# Patient Record
Sex: MALE | Race: WHITE | Employment: UNEMPLOYED | URBAN - METROPOLITAN AREA
[De-identification: names, ages, dates, MRNs, and addresses within clinical notes are randomized per-mention and may not be internally consistent; named-entity substitution may affect disease eponyms.]

---

## 2019-08-18 ENCOUNTER — HOSPITAL ENCOUNTER (INPATIENT)
Age: 69
LOS: 5 days | Discharge: HOME HEALTH | DRG: 699 | End: 2019-08-23
Attending: EMERGENCY MEDICINE | Admitting: INTERNAL MEDICINE
Payer: COMMERCIAL

## 2019-08-18 ENCOUNTER — APPOINTMENT (OUTPATIENT)
Dept: GENERAL RADIOLOGY | Age: 69
DRG: 699 | End: 2019-08-18
Payer: COMMERCIAL

## 2019-08-18 DIAGNOSIS — T83.511A URINARY TRACT INFECTION ASSOCIATED WITH INDWELLING URETHRAL CATHETER, INITIAL ENCOUNTER (HCC): ICD-10-CM

## 2019-08-18 DIAGNOSIS — N39.0 URINARY TRACT INFECTION ASSOCIATED WITH INDWELLING URETHRAL CATHETER, INITIAL ENCOUNTER (HCC): ICD-10-CM

## 2019-08-18 DIAGNOSIS — A41.9 SEPSIS, DUE TO UNSPECIFIED ORGANISM: Primary | ICD-10-CM

## 2019-08-18 LAB
ALBUMIN SERPL-MCNC: 3.4 G/DL (ref 3.5–5.2)
ALP BLD-CCNC: 81 U/L (ref 40–129)
ALT SERPL-CCNC: 16 U/L (ref 0–40)
AMORPHOUS: ABNORMAL
ANION GAP SERPL CALCULATED.3IONS-SCNC: 11 MMOL/L (ref 7–16)
AST SERPL-CCNC: 34 U/L (ref 0–39)
BACTERIA: ABNORMAL /HPF
BASOPHILS ABSOLUTE: 0.04 E9/L (ref 0–0.2)
BASOPHILS RELATIVE PERCENT: 0.2 % (ref 0–2)
BILIRUB SERPL-MCNC: 0.4 MG/DL (ref 0–1.2)
BILIRUBIN URINE: NEGATIVE
BLOOD, URINE: ABNORMAL
BUN BLDV-MCNC: 22 MG/DL (ref 8–23)
CALCIUM SERPL-MCNC: 9.2 MG/DL (ref 8.6–10.2)
CHLORIDE BLD-SCNC: 101 MMOL/L (ref 98–107)
CLARITY: ABNORMAL
CO2: 24 MMOL/L (ref 22–29)
COLOR: YELLOW
CREAT SERPL-MCNC: 1.2 MG/DL (ref 0.7–1.2)
EOSINOPHILS ABSOLUTE: 0.03 E9/L (ref 0.05–0.5)
EOSINOPHILS RELATIVE PERCENT: 0.2 % (ref 0–6)
GFR AFRICAN AMERICAN: >60
GFR NON-AFRICAN AMERICAN: 60 ML/MIN/1.73
GLUCOSE BLD-MCNC: 207 MG/DL (ref 74–99)
GLUCOSE URINE: NEGATIVE MG/DL
HCT VFR BLD CALC: 44.7 % (ref 37–54)
HEMOGLOBIN: 14.1 G/DL (ref 12.5–16.5)
IMMATURE GRANULOCYTES #: 0.1 E9/L
IMMATURE GRANULOCYTES %: 0.6 % (ref 0–5)
KETONES, URINE: NEGATIVE MG/DL
LACTIC ACID: 2.6 MMOL/L (ref 0.5–2.2)
LEUKOCYTE ESTERASE, URINE: ABNORMAL
LIPASE: 45 U/L (ref 13–60)
LYMPHOCYTES ABSOLUTE: 0.97 E9/L (ref 1.5–4)
LYMPHOCYTES RELATIVE PERCENT: 6 % (ref 20–42)
MCH RBC QN AUTO: 28.5 PG (ref 26–35)
MCHC RBC AUTO-ENTMCNC: 31.5 % (ref 32–34.5)
MCV RBC AUTO: 90.5 FL (ref 80–99.9)
MONOCYTES ABSOLUTE: 0.71 E9/L (ref 0.1–0.95)
MONOCYTES RELATIVE PERCENT: 4.4 % (ref 2–12)
NEUTROPHILS ABSOLUTE: 14.34 E9/L (ref 1.8–7.3)
NEUTROPHILS RELATIVE PERCENT: 88.6 % (ref 43–80)
NITRITE, URINE: POSITIVE
PDW BLD-RTO: 14.5 FL (ref 11.5–15)
PH UA: 7 (ref 5–9)
PLATELET # BLD: 260 E9/L (ref 130–450)
PMV BLD AUTO: 10 FL (ref 7–12)
POTASSIUM SERPL-SCNC: 5.1 MMOL/L (ref 3.5–5)
PROTEIN UA: 100 MG/DL
RBC # BLD: 4.94 E12/L (ref 3.8–5.8)
RBC UA: ABNORMAL /HPF (ref 0–2)
SODIUM BLD-SCNC: 136 MMOL/L (ref 132–146)
SPECIFIC GRAVITY UA: 1.01 (ref 1–1.03)
TOTAL PROTEIN: 7.6 G/DL (ref 6.4–8.3)
TROPONIN: <0.01 NG/ML (ref 0–0.03)
UROBILINOGEN, URINE: 0.2 E.U./DL
WBC # BLD: 16.2 E9/L (ref 4.5–11.5)
WBC UA: >20 /HPF (ref 0–5)

## 2019-08-18 PROCEDURE — 87077 CULTURE AEROBIC IDENTIFY: CPT

## 2019-08-18 PROCEDURE — 6360000002 HC RX W HCPCS: Performed by: EMERGENCY MEDICINE

## 2019-08-18 PROCEDURE — 6370000000 HC RX 637 (ALT 250 FOR IP): Performed by: EMERGENCY MEDICINE

## 2019-08-18 PROCEDURE — 99285 EMERGENCY DEPT VISIT HI MDM: CPT

## 2019-08-18 PROCEDURE — 85025 COMPLETE CBC W/AUTO DIFF WBC: CPT

## 2019-08-18 PROCEDURE — 2580000003 HC RX 258: Performed by: EMERGENCY MEDICINE

## 2019-08-18 PROCEDURE — 87088 URINE BACTERIA CULTURE: CPT

## 2019-08-18 PROCEDURE — 36415 COLL VENOUS BLD VENIPUNCTURE: CPT

## 2019-08-18 PROCEDURE — 84484 ASSAY OF TROPONIN QUANT: CPT

## 2019-08-18 PROCEDURE — 87186 SC STD MICRODIL/AGAR DIL: CPT

## 2019-08-18 PROCEDURE — 96374 THER/PROPH/DIAG INJ IV PUSH: CPT

## 2019-08-18 PROCEDURE — 80053 COMPREHEN METABOLIC PANEL: CPT

## 2019-08-18 PROCEDURE — 2060000000 HC ICU INTERMEDIATE R&B

## 2019-08-18 PROCEDURE — 81001 URINALYSIS AUTO W/SCOPE: CPT

## 2019-08-18 PROCEDURE — 71045 X-RAY EXAM CHEST 1 VIEW: CPT

## 2019-08-18 PROCEDURE — 87040 BLOOD CULTURE FOR BACTERIA: CPT

## 2019-08-18 PROCEDURE — 93005 ELECTROCARDIOGRAM TRACING: CPT | Performed by: EMERGENCY MEDICINE

## 2019-08-18 PROCEDURE — 83605 ASSAY OF LACTIC ACID: CPT

## 2019-08-18 PROCEDURE — 83690 ASSAY OF LIPASE: CPT

## 2019-08-18 RX ORDER — ACETAMINOPHEN 650 MG/1
650 SUPPOSITORY RECTAL ONCE
Status: COMPLETED | OUTPATIENT
Start: 2019-08-18 | End: 2019-08-18

## 2019-08-18 RX ORDER — 0.9 % SODIUM CHLORIDE 0.9 %
30 INTRAVENOUS SOLUTION INTRAVENOUS ONCE
Status: COMPLETED | OUTPATIENT
Start: 2019-08-18 | End: 2019-08-18

## 2019-08-18 RX ORDER — SODIUM CHLORIDE 0.9 % (FLUSH) 0.9 %
10 SYRINGE (ML) INJECTION EVERY 12 HOURS SCHEDULED
Status: DISCONTINUED | OUTPATIENT
Start: 2019-08-18 | End: 2019-08-23 | Stop reason: HOSPADM

## 2019-08-18 RX ORDER — 0.9 % SODIUM CHLORIDE 0.9 %
1000 INTRAVENOUS SOLUTION INTRAVENOUS ONCE
Status: DISCONTINUED | OUTPATIENT
Start: 2019-08-18 | End: 2019-08-18

## 2019-08-18 RX ORDER — SERTRALINE HYDROCHLORIDE 25 MG/1
75 TABLET, FILM COATED ORAL DAILY
COMMUNITY

## 2019-08-18 RX ORDER — HYDROXYZINE HYDROCHLORIDE 25 MG/1
25 TABLET, FILM COATED ORAL EVERY 6 HOURS PRN
COMMUNITY

## 2019-08-18 RX ORDER — SODIUM CHLORIDE 0.9 % (FLUSH) 0.9 %
10 SYRINGE (ML) INJECTION PRN
Status: DISCONTINUED | OUTPATIENT
Start: 2019-08-18 | End: 2019-08-23 | Stop reason: HOSPADM

## 2019-08-18 RX ORDER — ACETAMINOPHEN 325 MG/1
650 TABLET ORAL EVERY 4 HOURS PRN
Status: DISCONTINUED | OUTPATIENT
Start: 2019-08-18 | End: 2019-08-23 | Stop reason: HOSPADM

## 2019-08-18 RX ADMIN — MEROPENEM 1 G: 1 INJECTION INTRAVENOUS at 21:30

## 2019-08-18 RX ADMIN — SODIUM CHLORIDE 2586 ML: 900 INJECTION, SOLUTION INTRAVENOUS at 20:20

## 2019-08-18 RX ADMIN — ACETAMINOPHEN 650 MG: 650 SUPPOSITORY RECTAL at 20:20

## 2019-08-18 ASSESSMENT — PAIN SCALES - GENERAL
PAINLEVEL_OUTOF10: 0
PAINLEVEL_OUTOF10: 3

## 2019-08-19 ENCOUNTER — APPOINTMENT (OUTPATIENT)
Dept: CT IMAGING | Age: 69
DRG: 699 | End: 2019-08-19
Payer: COMMERCIAL

## 2019-08-19 LAB
ALBUMIN SERPL-MCNC: 2.6 G/DL (ref 3.5–5.2)
ALP BLD-CCNC: 64 U/L (ref 40–129)
ALT SERPL-CCNC: 11 U/L (ref 0–40)
ANION GAP SERPL CALCULATED.3IONS-SCNC: 9 MMOL/L (ref 7–16)
AST SERPL-CCNC: 16 U/L (ref 0–39)
BILIRUB SERPL-MCNC: 0.4 MG/DL (ref 0–1.2)
BUN BLDV-MCNC: 18 MG/DL (ref 8–23)
CALCIUM SERPL-MCNC: 8.7 MG/DL (ref 8.6–10.2)
CHLORIDE BLD-SCNC: 112 MMOL/L (ref 98–107)
CO2: 22 MMOL/L (ref 22–29)
CREAT SERPL-MCNC: 1.1 MG/DL (ref 0.7–1.2)
GFR AFRICAN AMERICAN: >60
GFR NON-AFRICAN AMERICAN: >60 ML/MIN/1.73
GLUCOSE BLD-MCNC: 113 MG/DL (ref 74–99)
HCT VFR BLD CALC: 41 % (ref 37–54)
HEMOGLOBIN: 12.4 G/DL (ref 12.5–16.5)
LACTIC ACID: 1.9 MMOL/L (ref 0.5–2.2)
LACTIC ACID: 2.7 MMOL/L (ref 0.5–2.2)
MCH RBC QN AUTO: 28.5 PG (ref 26–35)
MCHC RBC AUTO-ENTMCNC: 30.2 % (ref 32–34.5)
MCV RBC AUTO: 94.3 FL (ref 80–99.9)
PDW BLD-RTO: 14.6 FL (ref 11.5–15)
PLATELET # BLD: 199 E9/L (ref 130–450)
PMV BLD AUTO: 9.8 FL (ref 7–12)
POTASSIUM SERPL-SCNC: 4.3 MMOL/L (ref 3.5–5)
RBC # BLD: 4.35 E12/L (ref 3.8–5.8)
SODIUM BLD-SCNC: 143 MMOL/L (ref 132–146)
TOTAL PROTEIN: 6.3 G/DL (ref 6.4–8.3)
WBC # BLD: 11.6 E9/L (ref 4.5–11.5)

## 2019-08-19 PROCEDURE — 36415 COLL VENOUS BLD VENIPUNCTURE: CPT

## 2019-08-19 PROCEDURE — C9113 INJ PANTOPRAZOLE SODIUM, VIA: HCPCS | Performed by: INTERNAL MEDICINE

## 2019-08-19 PROCEDURE — 92610 EVALUATE SWALLOWING FUNCTION: CPT | Performed by: SPEECH-LANGUAGE PATHOLOGIST

## 2019-08-19 PROCEDURE — 83605 ASSAY OF LACTIC ACID: CPT

## 2019-08-19 PROCEDURE — 6360000002 HC RX W HCPCS: Performed by: INTERNAL MEDICINE

## 2019-08-19 PROCEDURE — 2580000003 HC RX 258: Performed by: INTERNAL MEDICINE

## 2019-08-19 PROCEDURE — 85027 COMPLETE CBC AUTOMATED: CPT

## 2019-08-19 PROCEDURE — 74176 CT ABD & PELVIS W/O CONTRAST: CPT

## 2019-08-19 PROCEDURE — 2060000000 HC ICU INTERMEDIATE R&B

## 2019-08-19 PROCEDURE — 80053 COMPREHEN METABOLIC PANEL: CPT

## 2019-08-19 RX ORDER — ACETAMINOPHEN 650 MG/1
650 SUPPOSITORY RECTAL EVERY 6 HOURS PRN
Status: DISCONTINUED | OUTPATIENT
Start: 2019-08-19 | End: 2019-08-23 | Stop reason: HOSPADM

## 2019-08-19 RX ORDER — DEXTROSE, SODIUM CHLORIDE, SODIUM LACTATE, POTASSIUM CHLORIDE, AND CALCIUM CHLORIDE 5; .6; .31; .03; .02 G/100ML; G/100ML; G/100ML; G/100ML; G/100ML
INJECTION, SOLUTION INTRAVENOUS CONTINUOUS
Status: DISCONTINUED | OUTPATIENT
Start: 2019-08-19 | End: 2019-08-22

## 2019-08-19 RX ORDER — PANTOPRAZOLE SODIUM 40 MG/10ML
40 INJECTION, POWDER, LYOPHILIZED, FOR SOLUTION INTRAVENOUS DAILY
Status: DISCONTINUED | OUTPATIENT
Start: 2019-08-19 | End: 2019-08-19 | Stop reason: SDUPTHER

## 2019-08-19 RX ORDER — 0.9 % SODIUM CHLORIDE 0.9 %
10 VIAL (ML) INJECTION DAILY
Status: DISCONTINUED | OUTPATIENT
Start: 2019-08-19 | End: 2019-08-23 | Stop reason: HOSPADM

## 2019-08-19 RX ORDER — PANTOPRAZOLE SODIUM 40 MG/10ML
40 INJECTION, POWDER, LYOPHILIZED, FOR SOLUTION INTRAVENOUS DAILY
Status: DISCONTINUED | OUTPATIENT
Start: 2019-08-19 | End: 2019-08-23 | Stop reason: HOSPADM

## 2019-08-19 RX ORDER — SODIUM CHLORIDE 9 MG/ML
INJECTION, SOLUTION INTRAVENOUS CONTINUOUS
Status: DISCONTINUED | OUTPATIENT
Start: 2019-08-19 | End: 2019-08-19

## 2019-08-19 RX ADMIN — Medication 10 ML: at 00:48

## 2019-08-19 RX ADMIN — SODIUM CHLORIDE: 9 INJECTION, SOLUTION INTRAVENOUS at 00:48

## 2019-08-19 RX ADMIN — MEROPENEM 1 G: 1 INJECTION, POWDER, FOR SOLUTION INTRAVENOUS at 13:16

## 2019-08-19 RX ADMIN — Medication 10 ML: at 09:02

## 2019-08-19 RX ADMIN — MEROPENEM 1 G: 1 INJECTION, POWDER, FOR SOLUTION INTRAVENOUS at 05:28

## 2019-08-19 RX ADMIN — SODIUM CHLORIDE, SODIUM LACTATE, POTASSIUM CHLORIDE, CALCIUM CHLORIDE AND DEXTROSE MONOHYDRATE: 5; 600; 310; 30; 20 INJECTION, SOLUTION INTRAVENOUS at 09:00

## 2019-08-19 RX ADMIN — ENOXAPARIN SODIUM 40 MG: 40 INJECTION SUBCUTANEOUS at 09:02

## 2019-08-19 RX ADMIN — PANTOPRAZOLE SODIUM 40 MG: 40 INJECTION, POWDER, FOR SOLUTION INTRAVENOUS at 09:02

## 2019-08-19 ASSESSMENT — PAIN SCALES - GENERAL
PAINLEVEL_OUTOF10: 2
PAINLEVEL_OUTOF10: 2
PAINLEVEL_OUTOF10: 3

## 2019-08-20 ENCOUNTER — APPOINTMENT (OUTPATIENT)
Dept: INTERVENTIONAL RADIOLOGY/VASCULAR | Age: 69
DRG: 699 | End: 2019-08-20
Payer: COMMERCIAL

## 2019-08-20 LAB
ANION GAP SERPL CALCULATED.3IONS-SCNC: 8 MMOL/L (ref 7–16)
BASOPHILS ABSOLUTE: 0.02 E9/L (ref 0–0.2)
BASOPHILS RELATIVE PERCENT: 0.2 % (ref 0–2)
BUN BLDV-MCNC: 9 MG/DL (ref 8–23)
CALCIUM SERPL-MCNC: 8.8 MG/DL (ref 8.6–10.2)
CHLORIDE BLD-SCNC: 106 MMOL/L (ref 98–107)
CO2: 27 MMOL/L (ref 22–29)
CREAT SERPL-MCNC: 0.9 MG/DL (ref 0.7–1.2)
EKG ATRIAL RATE: 112 BPM
EKG P AXIS: 47 DEGREES
EKG P-R INTERVAL: 128 MS
EKG Q-T INTERVAL: 342 MS
EKG QRS DURATION: 74 MS
EKG QTC CALCULATION (BAZETT): 466 MS
EKG R AXIS: 42 DEGREES
EKG T AXIS: 30 DEGREES
EKG VENTRICULAR RATE: 112 BPM
EOSINOPHILS ABSOLUTE: 0.2 E9/L (ref 0.05–0.5)
EOSINOPHILS RELATIVE PERCENT: 2.3 % (ref 0–6)
GFR AFRICAN AMERICAN: >60
GFR NON-AFRICAN AMERICAN: >60 ML/MIN/1.73
GLUCOSE BLD-MCNC: 140 MG/DL (ref 74–99)
HCT VFR BLD CALC: 41 % (ref 37–54)
HEMOGLOBIN: 12.6 G/DL (ref 12.5–16.5)
IMMATURE GRANULOCYTES #: 0.04 E9/L
IMMATURE GRANULOCYTES %: 0.5 % (ref 0–5)
LYMPHOCYTES ABSOLUTE: 1.61 E9/L (ref 1.5–4)
LYMPHOCYTES RELATIVE PERCENT: 18.8 % (ref 20–42)
MCH RBC QN AUTO: 28 PG (ref 26–35)
MCHC RBC AUTO-ENTMCNC: 30.7 % (ref 32–34.5)
MCV RBC AUTO: 91.1 FL (ref 80–99.9)
MONOCYTES ABSOLUTE: 0.82 E9/L (ref 0.1–0.95)
MONOCYTES RELATIVE PERCENT: 9.6 % (ref 2–12)
NEUTROPHILS ABSOLUTE: 5.88 E9/L (ref 1.8–7.3)
NEUTROPHILS RELATIVE PERCENT: 68.6 % (ref 43–80)
PDW BLD-RTO: 14.2 FL (ref 11.5–15)
PLATELET # BLD: 208 E9/L (ref 130–450)
PMV BLD AUTO: 10 FL (ref 7–12)
POTASSIUM SERPL-SCNC: 3.8 MMOL/L (ref 3.5–5)
RBC # BLD: 4.5 E12/L (ref 3.8–5.8)
SODIUM BLD-SCNC: 141 MMOL/L (ref 132–146)
WBC # BLD: 8.6 E9/L (ref 4.5–11.5)

## 2019-08-20 PROCEDURE — 6360000002 HC RX W HCPCS: Performed by: INTERNAL MEDICINE

## 2019-08-20 PROCEDURE — 2580000003 HC RX 258: Performed by: INTERNAL MEDICINE

## 2019-08-20 PROCEDURE — C1751 CATH, INF, PER/CENT/MIDLINE: HCPCS

## 2019-08-20 PROCEDURE — 6370000000 HC RX 637 (ALT 250 FOR IP): Performed by: INTERNAL MEDICINE

## 2019-08-20 PROCEDURE — 36415 COLL VENOUS BLD VENIPUNCTURE: CPT

## 2019-08-20 PROCEDURE — 2060000000 HC ICU INTERMEDIATE R&B

## 2019-08-20 PROCEDURE — 92526 ORAL FUNCTION THERAPY: CPT | Performed by: SPEECH-LANGUAGE PATHOLOGIST

## 2019-08-20 PROCEDURE — 85025 COMPLETE CBC W/AUTO DIFF WBC: CPT

## 2019-08-20 PROCEDURE — C9113 INJ PANTOPRAZOLE SODIUM, VIA: HCPCS | Performed by: INTERNAL MEDICINE

## 2019-08-20 PROCEDURE — 2500000003 HC RX 250 WO HCPCS: Performed by: RADIOLOGY

## 2019-08-20 PROCEDURE — 02HV33Z INSERTION OF INFUSION DEVICE INTO SUPERIOR VENA CAVA, PERCUTANEOUS APPROACH: ICD-10-PCS | Performed by: RADIOLOGY

## 2019-08-20 PROCEDURE — 80048 BASIC METABOLIC PNL TOTAL CA: CPT

## 2019-08-20 PROCEDURE — 93010 ELECTROCARDIOGRAM REPORT: CPT | Performed by: INTERNAL MEDICINE

## 2019-08-20 PROCEDURE — 36573 INSJ PICC RS&I 5 YR+: CPT

## 2019-08-20 RX ORDER — POLYETHYLENE GLYCOL 3350 17 G/17G
17 POWDER, FOR SOLUTION ORAL 2 TIMES DAILY
Status: DISCONTINUED | OUTPATIENT
Start: 2019-08-20 | End: 2019-08-23 | Stop reason: HOSPADM

## 2019-08-20 RX ORDER — SODIUM CHLORIDE 0.9 % (FLUSH) 0.9 %
10 SYRINGE (ML) INJECTION PRN
Status: DISCONTINUED | OUTPATIENT
Start: 2019-08-20 | End: 2019-08-20 | Stop reason: SDUPTHER

## 2019-08-20 RX ORDER — HEPARIN SODIUM (PORCINE) LOCK FLUSH IV SOLN 100 UNIT/ML 100 UNIT/ML
3 SOLUTION INTRAVENOUS PRN
Status: DISCONTINUED | OUTPATIENT
Start: 2019-08-20 | End: 2019-08-23 | Stop reason: HOSPADM

## 2019-08-20 RX ORDER — HEPARIN SODIUM (PORCINE) LOCK FLUSH IV SOLN 100 UNIT/ML 100 UNIT/ML
3 SOLUTION INTRAVENOUS EVERY 12 HOURS SCHEDULED
Status: DISCONTINUED | OUTPATIENT
Start: 2019-08-20 | End: 2019-08-23 | Stop reason: HOSPADM

## 2019-08-20 RX ORDER — LIDOCAINE HYDROCHLORIDE 10 MG/ML
10 INJECTION, SOLUTION INFILTRATION; PERINEURAL ONCE
Status: DISCONTINUED | OUTPATIENT
Start: 2019-08-20 | End: 2019-08-20 | Stop reason: ALTCHOICE

## 2019-08-20 RX ORDER — SODIUM CHLORIDE 0.9 % (FLUSH) 0.9 %
10 SYRINGE (ML) INJECTION EVERY 12 HOURS SCHEDULED
Status: DISCONTINUED | OUTPATIENT
Start: 2019-08-20 | End: 2019-08-20 | Stop reason: SDUPTHER

## 2019-08-20 RX ADMIN — MEROPENEM 1 G: 1 INJECTION, POWDER, FOR SOLUTION INTRAVENOUS at 06:36

## 2019-08-20 RX ADMIN — MEROPENEM 1 G: 1 INJECTION, POWDER, FOR SOLUTION INTRAVENOUS at 12:47

## 2019-08-20 RX ADMIN — SERTRALINE 75 MG: 50 TABLET, FILM COATED ORAL at 08:07

## 2019-08-20 RX ADMIN — Medication 10 ML: at 01:13

## 2019-08-20 RX ADMIN — MEROPENEM 1 G: 1 INJECTION, POWDER, FOR SOLUTION INTRAVENOUS at 01:13

## 2019-08-20 RX ADMIN — SODIUM CHLORIDE, SODIUM LACTATE, POTASSIUM CHLORIDE, CALCIUM CHLORIDE AND DEXTROSE MONOHYDRATE: 5; 600; 310; 30; 20 INJECTION, SOLUTION INTRAVENOUS at 01:13

## 2019-08-20 RX ADMIN — DOCUSATE SODIUM 100 MG: 50 LIQUID ORAL at 08:06

## 2019-08-20 RX ADMIN — LIDOCAINE HYDROCHLORIDE 10 ML: 10 INJECTION, SOLUTION INFILTRATION; PERINEURAL at 15:11

## 2019-08-20 RX ADMIN — PANTOPRAZOLE SODIUM 40 MG: 40 INJECTION, POWDER, FOR SOLUTION INTRAVENOUS at 08:06

## 2019-08-20 RX ADMIN — Medication 10 ML: at 08:11

## 2019-08-20 ASSESSMENT — PAIN SCALES - GENERAL
PAINLEVEL_OUTOF10: 5
PAINLEVEL_OUTOF10: 2
PAINLEVEL_OUTOF10: 2

## 2019-08-21 LAB
ALBUMIN SERPL-MCNC: 2.7 G/DL (ref 3.5–5.2)
ALP BLD-CCNC: 60 U/L (ref 40–129)
ALT SERPL-CCNC: 12 U/L (ref 0–40)
ANION GAP SERPL CALCULATED.3IONS-SCNC: 6 MMOL/L (ref 7–16)
AST SERPL-CCNC: 16 U/L (ref 0–39)
BASOPHILS ABSOLUTE: 0.02 E9/L (ref 0–0.2)
BASOPHILS RELATIVE PERCENT: 0.3 % (ref 0–2)
BILIRUB SERPL-MCNC: 0.3 MG/DL (ref 0–1.2)
BUN BLDV-MCNC: 10 MG/DL (ref 8–23)
CALCIUM SERPL-MCNC: 9 MG/DL (ref 8.6–10.2)
CEA: 1.4 NG/ML (ref 0–5.2)
CHLORIDE BLD-SCNC: 107 MMOL/L (ref 98–107)
CO2: 30 MMOL/L (ref 22–29)
CREAT SERPL-MCNC: 1 MG/DL (ref 0.7–1.2)
EOSINOPHILS ABSOLUTE: 0.27 E9/L (ref 0.05–0.5)
EOSINOPHILS RELATIVE PERCENT: 3.4 % (ref 0–6)
FERRITIN: 368 NG/ML
GFR AFRICAN AMERICAN: >60
GFR NON-AFRICAN AMERICAN: >60 ML/MIN/1.73
GLUCOSE BLD-MCNC: 122 MG/DL (ref 74–99)
HCT VFR BLD CALC: 38.1 % (ref 37–54)
HEMOGLOBIN: 12.1 G/DL (ref 12.5–16.5)
IMMATURE GRANULOCYTES #: 0.03 E9/L
IMMATURE GRANULOCYTES %: 0.4 % (ref 0–5)
IRON SATURATION: 31 % (ref 20–55)
IRON: 44 MCG/DL (ref 59–158)
LYMPHOCYTES ABSOLUTE: 1.76 E9/L (ref 1.5–4)
LYMPHOCYTES RELATIVE PERCENT: 22 % (ref 20–42)
MAGNESIUM: 1.7 MG/DL (ref 1.6–2.6)
MCH RBC QN AUTO: 28.9 PG (ref 26–35)
MCHC RBC AUTO-ENTMCNC: 31.8 % (ref 32–34.5)
MCV RBC AUTO: 90.9 FL (ref 80–99.9)
MONOCYTES ABSOLUTE: 0.58 E9/L (ref 0.1–0.95)
MONOCYTES RELATIVE PERCENT: 7.3 % (ref 2–12)
NEUTROPHILS ABSOLUTE: 5.34 E9/L (ref 1.8–7.3)
NEUTROPHILS RELATIVE PERCENT: 66.6 % (ref 43–80)
PDW BLD-RTO: 14.6 FL (ref 11.5–15)
PLATELET # BLD: 207 E9/L (ref 130–450)
PMV BLD AUTO: 9.7 FL (ref 7–12)
POTASSIUM SERPL-SCNC: 3.9 MMOL/L (ref 3.5–5)
RBC # BLD: 4.19 E12/L (ref 3.8–5.8)
SODIUM BLD-SCNC: 143 MMOL/L (ref 132–146)
TOTAL IRON BINDING CAPACITY: 144 MCG/DL (ref 250–450)
TOTAL PROTEIN: 6.7 G/DL (ref 6.4–8.3)
WBC # BLD: 8 E9/L (ref 4.5–11.5)

## 2019-08-21 PROCEDURE — C9113 INJ PANTOPRAZOLE SODIUM, VIA: HCPCS | Performed by: INTERNAL MEDICINE

## 2019-08-21 PROCEDURE — 87329 GIARDIA AG IA: CPT

## 2019-08-21 PROCEDURE — 2580000003 HC RX 258: Performed by: INTERNAL MEDICINE

## 2019-08-21 PROCEDURE — 85025 COMPLETE CBC W/AUTO DIFF WBC: CPT

## 2019-08-21 PROCEDURE — 6370000000 HC RX 637 (ALT 250 FOR IP): Performed by: HOSPITALIST

## 2019-08-21 PROCEDURE — 6360000002 HC RX W HCPCS: Performed by: INTERNAL MEDICINE

## 2019-08-21 PROCEDURE — 83540 ASSAY OF IRON: CPT

## 2019-08-21 PROCEDURE — 82705 FATS/LIPIDS FECES QUAL: CPT

## 2019-08-21 PROCEDURE — 87425 ROTAVIRUS AG IA: CPT

## 2019-08-21 PROCEDURE — 82378 CARCINOEMBRYONIC ANTIGEN: CPT

## 2019-08-21 PROCEDURE — 6360000002 HC RX W HCPCS: Performed by: RADIOLOGY

## 2019-08-21 PROCEDURE — 82272 OCCULT BLD FECES 1-3 TESTS: CPT

## 2019-08-21 PROCEDURE — 80053 COMPREHEN METABOLIC PANEL: CPT

## 2019-08-21 PROCEDURE — 83550 IRON BINDING TEST: CPT

## 2019-08-21 PROCEDURE — 83735 ASSAY OF MAGNESIUM: CPT

## 2019-08-21 PROCEDURE — 82728 ASSAY OF FERRITIN: CPT

## 2019-08-21 PROCEDURE — 87045 FECES CULTURE AEROBIC BACT: CPT

## 2019-08-21 PROCEDURE — 87328 CRYPTOSPORIDIUM AG IA: CPT

## 2019-08-21 PROCEDURE — 89055 LEUKOCYTE ASSESSMENT FECAL: CPT

## 2019-08-21 PROCEDURE — 2060000000 HC ICU INTERMEDIATE R&B

## 2019-08-21 PROCEDURE — 6370000000 HC RX 637 (ALT 250 FOR IP): Performed by: INTERNAL MEDICINE

## 2019-08-21 PROCEDURE — 36415 COLL VENOUS BLD VENIPUNCTURE: CPT

## 2019-08-21 RX ADMIN — MEROPENEM 1 G: 1 INJECTION, POWDER, FOR SOLUTION INTRAVENOUS at 05:35

## 2019-08-21 RX ADMIN — SODIUM CHLORIDE, SODIUM LACTATE, POTASSIUM CHLORIDE, CALCIUM CHLORIDE AND DEXTROSE MONOHYDRATE: 5; 600; 310; 30; 20 INJECTION, SOLUTION INTRAVENOUS at 20:41

## 2019-08-21 RX ADMIN — POLYETHYLENE GLYCOL 3350 17 G: 17 POWDER, FOR SOLUTION ORAL at 00:04

## 2019-08-21 RX ADMIN — HEPARIN 300 UNITS: 100 SYRINGE at 08:13

## 2019-08-21 RX ADMIN — Medication 10 ML: at 08:15

## 2019-08-21 RX ADMIN — SERTRALINE 75 MG: 50 TABLET, FILM COATED ORAL at 08:13

## 2019-08-21 RX ADMIN — MEROPENEM 1 G: 1 INJECTION, POWDER, FOR SOLUTION INTRAVENOUS at 11:33

## 2019-08-21 RX ADMIN — PANTOPRAZOLE SODIUM 40 MG: 40 INJECTION, POWDER, FOR SOLUTION INTRAVENOUS at 08:13

## 2019-08-21 RX ADMIN — MEROPENEM 1 G: 1 INJECTION, POWDER, FOR SOLUTION INTRAVENOUS at 00:11

## 2019-08-21 RX ADMIN — POLYETHYLENE GLYCOL 3350 17 G: 17 POWDER, FOR SOLUTION ORAL at 20:41

## 2019-08-21 RX ADMIN — POLYETHYLENE GLYCOL 3350 17 G: 17 POWDER, FOR SOLUTION ORAL at 08:13

## 2019-08-21 RX ADMIN — SODIUM CHLORIDE, SODIUM LACTATE, POTASSIUM CHLORIDE, CALCIUM CHLORIDE AND DEXTROSE MONOHYDRATE: 5; 600; 310; 30; 20 INJECTION, SOLUTION INTRAVENOUS at 06:34

## 2019-08-21 RX ADMIN — Medication 10 ML: at 00:11

## 2019-08-21 RX ADMIN — HEPARIN 300 UNITS: 100 SYRINGE at 00:10

## 2019-08-21 RX ADMIN — HEPARIN 300 UNITS: 100 SYRINGE at 20:41

## 2019-08-21 RX ADMIN — ENOXAPARIN SODIUM 40 MG: 40 INJECTION SUBCUTANEOUS at 08:14

## 2019-08-21 RX ADMIN — Medication 10 ML: at 08:13

## 2019-08-21 RX ADMIN — DOCUSATE SODIUM 100 MG: 50 LIQUID ORAL at 08:14

## 2019-08-21 RX ADMIN — MEROPENEM 1 G: 1 INJECTION, POWDER, FOR SOLUTION INTRAVENOUS at 20:40

## 2019-08-22 LAB
CRYPTOSPORIDIUM ANTIGEN STOOL: NORMAL
GIARDIA ANTIGEN STOOL: NORMAL
OCCULT BLOOD DIAGNOSTIC: NORMAL
ROTAVIRUS ANTIGEN: NORMAL
WHITE BLOOD CELLS (WBC), STOOL: NORMAL

## 2019-08-22 PROCEDURE — C9113 INJ PANTOPRAZOLE SODIUM, VIA: HCPCS | Performed by: INTERNAL MEDICINE

## 2019-08-22 PROCEDURE — 2580000003 HC RX 258: Performed by: INTERNAL MEDICINE

## 2019-08-22 PROCEDURE — 6370000000 HC RX 637 (ALT 250 FOR IP): Performed by: HOSPITALIST

## 2019-08-22 PROCEDURE — 6360000002 HC RX W HCPCS: Performed by: RADIOLOGY

## 2019-08-22 PROCEDURE — 6370000000 HC RX 637 (ALT 250 FOR IP): Performed by: INTERNAL MEDICINE

## 2019-08-22 PROCEDURE — 92526 ORAL FUNCTION THERAPY: CPT | Performed by: SPEECH-LANGUAGE PATHOLOGIST

## 2019-08-22 PROCEDURE — 6360000002 HC RX W HCPCS: Performed by: INTERNAL MEDICINE

## 2019-08-22 PROCEDURE — 1200000000 HC SEMI PRIVATE

## 2019-08-22 RX ADMIN — POLYETHYLENE GLYCOL 3350 17 G: 17 POWDER, FOR SOLUTION ORAL at 21:38

## 2019-08-22 RX ADMIN — MEROPENEM 1 G: 1 INJECTION, POWDER, FOR SOLUTION INTRAVENOUS at 05:43

## 2019-08-22 RX ADMIN — ENOXAPARIN SODIUM 40 MG: 40 INJECTION SUBCUTANEOUS at 09:28

## 2019-08-22 RX ADMIN — Medication 10 ML: at 21:39

## 2019-08-22 RX ADMIN — SERTRALINE 75 MG: 50 TABLET, FILM COATED ORAL at 09:28

## 2019-08-22 RX ADMIN — HEPARIN 300 UNITS: 100 SYRINGE at 09:29

## 2019-08-22 RX ADMIN — Medication 10 ML: at 09:29

## 2019-08-22 RX ADMIN — MEROPENEM 1 G: 1 INJECTION, POWDER, FOR SOLUTION INTRAVENOUS at 21:30

## 2019-08-22 RX ADMIN — DOCUSATE SODIUM 100 MG: 50 LIQUID ORAL at 09:28

## 2019-08-22 RX ADMIN — HEPARIN 300 UNITS: 100 SYRINGE at 21:38

## 2019-08-22 RX ADMIN — PANTOPRAZOLE SODIUM 40 MG: 40 INJECTION, POWDER, FOR SOLUTION INTRAVENOUS at 09:28

## 2019-08-22 RX ADMIN — Medication 10 ML: at 09:31

## 2019-08-22 RX ADMIN — POLYETHYLENE GLYCOL 3350 17 G: 17 POWDER, FOR SOLUTION ORAL at 09:20

## 2019-08-22 RX ADMIN — MEROPENEM 1 G: 1 INJECTION, POWDER, FOR SOLUTION INTRAVENOUS at 14:36

## 2019-08-22 ASSESSMENT — PAIN SCALES - PAIN ASSESSMENT IN ADVANCED DEMENTIA (PAINAD)
BREATHING: 0
TOTALSCORE: 0
NEGVOCALIZATION: 0
FACIALEXPRESSION: 0
BODYLANGUAGE: 0
CONSOLABILITY: 0
NEGVOCALIZATION: 0
BODYLANGUAGE: 0
BREATHING: 0
CONSOLABILITY: 0
FACIALEXPRESSION: 0
BREATHING: 0
NEGVOCALIZATION: 0
TOTALSCORE: 0
TOTALSCORE: 0
BODYLANGUAGE: 0
CONSOLABILITY: 0
FACIALEXPRESSION: 0

## 2019-08-22 ASSESSMENT — PAIN SCALES - GENERAL
PAINLEVEL_OUTOF10: 0
PAINLEVEL_OUTOF10: 0
PAINLEVEL_OUTOF10: 3

## 2019-08-23 VITALS
DIASTOLIC BLOOD PRESSURE: 77 MMHG | BODY MASS INDEX: 29.13 KG/M2 | WEIGHT: 196.7 LBS | SYSTOLIC BLOOD PRESSURE: 133 MMHG | TEMPERATURE: 98.8 F | RESPIRATION RATE: 16 BRPM | HEART RATE: 86 BPM | OXYGEN SATURATION: 95 % | HEIGHT: 69 IN

## 2019-08-23 LAB
ALBUMIN SERPL-MCNC: 3.1 G/DL (ref 3.5–5.2)
ALP BLD-CCNC: 69 U/L (ref 40–129)
ALT SERPL-CCNC: 19 U/L (ref 0–40)
ANION GAP SERPL CALCULATED.3IONS-SCNC: 7 MMOL/L (ref 7–16)
AST SERPL-CCNC: 22 U/L (ref 0–39)
BASOPHILS ABSOLUTE: 0.04 E9/L (ref 0–0.2)
BASOPHILS RELATIVE PERCENT: 0.5 % (ref 0–2)
BILIRUB SERPL-MCNC: 0.4 MG/DL (ref 0–1.2)
BUN BLDV-MCNC: 10 MG/DL (ref 8–23)
CALCIUM SERPL-MCNC: 9.5 MG/DL (ref 8.6–10.2)
CHLORIDE BLD-SCNC: 103 MMOL/L (ref 98–107)
CO2: 31 MMOL/L (ref 22–29)
CREAT SERPL-MCNC: 1 MG/DL (ref 0.7–1.2)
CULTURE, STOOL: NORMAL
EOSINOPHILS ABSOLUTE: 0.41 E9/L (ref 0.05–0.5)
EOSINOPHILS RELATIVE PERCENT: 5.1 % (ref 0–6)
GFR AFRICAN AMERICAN: >60
GFR NON-AFRICAN AMERICAN: >60 ML/MIN/1.73
GLUCOSE BLD-MCNC: 112 MG/DL (ref 74–99)
HCT VFR BLD CALC: 41.7 % (ref 37–54)
HEMOGLOBIN: 13.1 G/DL (ref 12.5–16.5)
IMMATURE GRANULOCYTES #: 0.04 E9/L
IMMATURE GRANULOCYTES %: 0.5 % (ref 0–5)
LYMPHOCYTES ABSOLUTE: 1.94 E9/L (ref 1.5–4)
LYMPHOCYTES RELATIVE PERCENT: 24.3 % (ref 20–42)
MCH RBC QN AUTO: 28.4 PG (ref 26–35)
MCHC RBC AUTO-ENTMCNC: 31.4 % (ref 32–34.5)
MCV RBC AUTO: 90.5 FL (ref 80–99.9)
MONOCYTES ABSOLUTE: 0.52 E9/L (ref 0.1–0.95)
MONOCYTES RELATIVE PERCENT: 6.5 % (ref 2–12)
NEUTROPHILS ABSOLUTE: 5.03 E9/L (ref 1.8–7.3)
NEUTROPHILS RELATIVE PERCENT: 63.1 % (ref 43–80)
PDW BLD-RTO: 14.3 FL (ref 11.5–15)
PLATELET # BLD: 203 E9/L (ref 130–450)
PMV BLD AUTO: 9.7 FL (ref 7–12)
POTASSIUM SERPL-SCNC: 3.9 MMOL/L (ref 3.5–5)
RBC # BLD: 4.61 E12/L (ref 3.8–5.8)
SODIUM BLD-SCNC: 141 MMOL/L (ref 132–146)
TOTAL PROTEIN: 7 G/DL (ref 6.4–8.3)
WBC # BLD: 8 E9/L (ref 4.5–11.5)

## 2019-08-23 PROCEDURE — 6360000002 HC RX W HCPCS: Performed by: INTERNAL MEDICINE

## 2019-08-23 PROCEDURE — 97530 THERAPEUTIC ACTIVITIES: CPT | Performed by: PHYSICAL THERAPIST

## 2019-08-23 PROCEDURE — C9113 INJ PANTOPRAZOLE SODIUM, VIA: HCPCS | Performed by: INTERNAL MEDICINE

## 2019-08-23 PROCEDURE — 85025 COMPLETE CBC W/AUTO DIFF WBC: CPT

## 2019-08-23 PROCEDURE — 2580000003 HC RX 258: Performed by: SPECIALIST

## 2019-08-23 PROCEDURE — 6370000000 HC RX 637 (ALT 250 FOR IP): Performed by: HOSPITALIST

## 2019-08-23 PROCEDURE — 2580000003 HC RX 258: Performed by: INTERNAL MEDICINE

## 2019-08-23 PROCEDURE — 97167 OT EVAL HIGH COMPLEX 60 MIN: CPT

## 2019-08-23 PROCEDURE — 97530 THERAPEUTIC ACTIVITIES: CPT

## 2019-08-23 PROCEDURE — 97161 PT EVAL LOW COMPLEX 20 MIN: CPT | Performed by: PHYSICAL THERAPIST

## 2019-08-23 PROCEDURE — 6360000002 HC RX W HCPCS: Performed by: SPECIALIST

## 2019-08-23 PROCEDURE — 6360000002 HC RX W HCPCS: Performed by: RADIOLOGY

## 2019-08-23 PROCEDURE — 36415 COLL VENOUS BLD VENIPUNCTURE: CPT

## 2019-08-23 PROCEDURE — 80053 COMPREHEN METABOLIC PANEL: CPT

## 2019-08-23 PROCEDURE — 6370000000 HC RX 637 (ALT 250 FOR IP): Performed by: INTERNAL MEDICINE

## 2019-08-23 RX ADMIN — POLYETHYLENE GLYCOL 3350 17 G: 17 POWDER, FOR SOLUTION ORAL at 08:46

## 2019-08-23 RX ADMIN — ENOXAPARIN SODIUM 40 MG: 40 INJECTION SUBCUTANEOUS at 08:46

## 2019-08-23 RX ADMIN — Medication 10 ML: at 08:47

## 2019-08-23 RX ADMIN — HEPARIN 300 UNITS: 100 SYRINGE at 08:47

## 2019-08-23 RX ADMIN — MEROPENEM 1 G: 1 INJECTION, POWDER, FOR SOLUTION INTRAVENOUS at 05:15

## 2019-08-23 RX ADMIN — PANTOPRAZOLE SODIUM 40 MG: 40 INJECTION, POWDER, FOR SOLUTION INTRAVENOUS at 08:47

## 2019-08-23 RX ADMIN — WATER 2 G: 1 INJECTION INTRAMUSCULAR; INTRAVENOUS; SUBCUTANEOUS at 10:23

## 2019-08-23 RX ADMIN — Medication 10 ML: at 08:48

## 2019-08-23 RX ADMIN — SERTRALINE 75 MG: 50 TABLET, FILM COATED ORAL at 08:46

## 2019-08-23 ASSESSMENT — PAIN SCALES - PAIN ASSESSMENT IN ADVANCED DEMENTIA (PAINAD)
TOTALSCORE: 0
FACIALEXPRESSION: 0
BODYLANGUAGE: 0
NEGVOCALIZATION: 0
CONSOLABILITY: 0
BREATHING: 0

## 2019-08-24 LAB
CULTURE, BLOOD 2: NORMAL
FECAL NEUTRAL FAT: NORMAL
FECAL SPLIT FATS: NORMAL

## 2019-08-25 LAB
ORGANISM: ABNORMAL
ORGANISM: ABNORMAL
URINE CULTURE, ROUTINE: ABNORMAL
URINE CULTURE, ROUTINE: ABNORMAL

## 2019-09-01 ENCOUNTER — HOSPITAL ENCOUNTER (EMERGENCY)
Age: 69
Discharge: HOME | End: 2019-09-01
Attending: EMERGENCY MEDICINE
Payer: COMMERCIAL

## 2019-09-01 VITALS
RESPIRATION RATE: 14 BRPM | DIASTOLIC BLOOD PRESSURE: 61 MMHG | HEART RATE: 71 BPM | WEIGHT: 203.1 LBS | BODY MASS INDEX: 29.99 KG/M2 | OXYGEN SATURATION: 98 % | TEMPERATURE: 98.5 F | SYSTOLIC BLOOD PRESSURE: 123 MMHG

## 2019-09-01 DIAGNOSIS — Z78.9 PROBLEM WITH VASCULAR ACCESS: Primary | ICD-10-CM

## 2019-09-01 PROCEDURE — 99283 EMERGENCY DEPT VISIT LOW MDM: CPT

## 2019-09-01 ASSESSMENT — ENCOUNTER SYMPTOMS
SINUS PAIN: 0
SHORTNESS OF BREATH: 0
COUGH: 0
BACK PAIN: 0
NAUSEA: 0
SORE THROAT: 0
VOMITING: 0
ABDOMINAL PAIN: 0
CHEST TIGHTNESS: 0
DIARRHEA: 0

## 2019-09-01 NOTE — ED PROVIDER NOTES
sensory deficit. He exhibits normal muscle tone. Answers basic questions like telling me his name and that he is not in any pain   Skin: Skin is warm and dry. Capillary refill takes less than 2 seconds. He is not diaphoretic. R PICC hanging out of patient's arm, no bleeding, no redness, no sign of infection   Psychiatric: He has a normal mood and affect. His behavior is normal. Judgment and thought content normal.   Nursing note and vitals reviewed. Procedures     MDM  Number of Diagnoses or Management Options  Problem with vascular access:   Diagnosis management comments: This is a 50-year-old male presents from nursing home for vascular access. His right PICC is essentially pulled out of his arm. He is getting IV Rocephin for UTI. As it is a Sunday holiday evening and we do not have a vascular access team here to place a PICC line, IV is placed and we will have the patient follow-up for additional access tomorrow or the following day. Patient discharged back to nursing home with stable vital signs. ED Course as of Sep 01 1626   Sun Sep 01, 2019   1606 PICC line out of place, will pull in place peripheral IV and send back to nursing home. He is receiving Rocephin and does not need a midline/picc at this time, he can have this placed whenever the team is available. [CW]   Chuckie:     I have personally performed and/or participated in the history, exam, medical decision making, and procedures and agree with all pertinent clinical information unless otherwise noted. I have also reviewed and agree with the past medical, family and social history unless otherwise noted. I have discussed this patient in detail with the resident, and provided the instruction and education regarding patient here for evaluation of a dislodged right arm PICC line that he has in for receiving Rocephin. No other complaints. No arm swelling or redness.   Patient has a history of

## 2019-09-01 NOTE — ED NOTES
PICC line removed and Peripheral IV inserted.  Pressure dressing applied to right basilic      Steve Bassett RN  09/01/19 1810

## 2019-09-02 NOTE — ED NOTES
Pt sent back to [facility] attempting to call report no answer     Zoey Bagley RN  09/01/19 2001

## 2020-07-02 ENCOUNTER — PREP FOR PROCEDURE (OUTPATIENT)
Dept: DENTISTRY | Age: 70
End: 2020-07-02

## 2020-07-02 RX ORDER — SODIUM CHLORIDE 0.9 % (FLUSH) 0.9 %
10 SYRINGE (ML) INJECTION PRN
Status: CANCELLED | OUTPATIENT
Start: 2020-07-02

## 2020-07-02 RX ORDER — SODIUM CHLORIDE 0.9 % (FLUSH) 0.9 %
10 SYRINGE (ML) INJECTION EVERY 12 HOURS SCHEDULED
Status: CANCELLED | OUTPATIENT
Start: 2020-07-02

## 2020-07-02 RX ORDER — SODIUM CHLORIDE, SODIUM LACTATE, POTASSIUM CHLORIDE, CALCIUM CHLORIDE 600; 310; 30; 20 MG/100ML; MG/100ML; MG/100ML; MG/100ML
INJECTION, SOLUTION INTRAVENOUS CONTINUOUS
Status: CANCELLED | OUTPATIENT
Start: 2020-07-02

## 2020-07-02 NOTE — PROGRESS NOTES
I CALLED DENTAL CLINIC AND SPOKE TO [phone]. PER MIKEY THEY HAVE MADE SEVERAL ATTEMPTS TO PMD IN ORDER TO OBTAIN H&P, THEY HAVE NOT RECEIVED H&P YET, CHRISTELLE TO BE NOTIFIED ON Monday MORNING 7/6/20.

## 2020-07-02 NOTE — PROGRESS NOTES
PER PATIENTS NURSE [place] AT [employer], PATIENT IS A FULL CODE. PATIENTS LEGAL GUARDIAN, COLTONROLO MARTINORISON WILL ACCOMPANY PATIENT TO OR THE MORNING OF SURGERY AND SIGN HIS PERMITS. PATIENT TO ARRIVE TO PRE OP AT 0745. INSTRUCTIONS CALLED AND FAXED TO FACILITY.

## 2020-07-02 NOTE — H&P (VIEW-ONLY)
08/23/2019     08/23/2019    MCV 90.5 08/23/2019    MCH 28.4 08/23/2019    MCHC 31.4 08/23/2019    RDW 14.3 08/23/2019    LYMPHOPCT 24.3 08/23/2019    MONOPCT 6.5 08/23/2019    BASOPCT 0.5 08/23/2019    MONOSABS 0.52 08/23/2019    LYMPHSABS 1.94 08/23/2019    EOSABS 0.41 08/23/2019    BASOSABS 0.04 08/23/2019       Imaging Studies:  Radiology:   Pt was uncooperative and unable to take radiographs    Oral Findings:    Hygiene: mucous membranes moist, pharynx normal without lesions and dental hygiene poor    Dentition: poor    Teeth: caries: unknown    Retained roots #7    Impactions tooth # TBD    Gingiva: inflamed    Mucous Membrane: mucous membranes moist, pharynx normal without lesions and dental hygiene poor    Tongue: fissured    Floor of mouth: normal    Alveolar Process: normal    Salivary Glands: normal    Tentative Diagnosis: dental caries, retained root tip #7 and #30 MBL fracture    Resident Assessment and Plan: Update radiographs and determine restorative needs.  Most likely extractions of #7 and #30      Clifford Sprague DDS  7/2/2020  5:39 PM    Attending physician: Dr. Jagjit Butler

## 2020-07-02 NOTE — PROGRESS NOTES
[facility] PRE-ADMISSION TESTING GENERAL INSTRUCTIONS- [facility]-phone number:[phone]    GENERAL INSTRUCTIONS  [x] Antibacterial Soap shower Night before and/or AM of Surgery. [x] Nothing by mouth after midnight, including gum, candy, mints, or water. [x] You may brush your teeth, gargle, but do NOT swallow water. [x] Jewelry, valuables or body piercing's should not be brought to the hospital. All body and/or tongue piercing's must be removed prior to arriving to hospital.  ALL hair pins must be removed. [x] Do not wear makeup, lotions, powders, deodorant. Nail polish as directed by the nurse. [x] Arrange transportation with a responsible adult  to and from the hospital. If you do not have a responsible adult  to transport you, you will need to make arrangements with a medical transportation company (i.e. [employer]. A Uber/taxi/bus is not appropriate unless you are accompanied by a responsible adult ). Arrange for someone to be with you for the remainder of the day and for 24 hours after your procedure due to having had anesthesia. Who will be your  for transportation?_______[facility]___________   Who will be staying with you for 24 hrs after your procedure?_______RETURN TO [facility]  ___________  [x] Bring insurance card and photo ID. PARKING INSTRUCTIONS:   [x] Arrival Time:____0745  _________  [x] PLEASE ARRIVE AT [facility] ENTRANCE AT [phone] ON 7/10/2020  PLEASE WEAR A MASK. YOU WILL BE DIRECTED TO PRE OP. [x] [facility] [address] [facility], upon entering the hospital, take elevator B to the 3rd floor. EDUCATION INSTRUCTIONS:        [x] Incentive Spirometry,coughing & deep breathing exercises reviewed. [x]Medication information sheet(s)   [x]Fluoroscopy-Xray used in surgery reviewed with patient. Educational pamphlet placed in chart.         MEDICATION INSTRUCTIONS:   [x]Bring a complete list of your medications, please write the last time you took the medicine, give this list to the nurse. [x] Take the following medications the morning of surgery with 1-2 ounces of water: SEE LIST  [x] Stop herbal supplements and vitamins 5 days before your surgery. [x] Follow physician instructions regarding any blood thinners you may be taking. WHAT TO EXPECT:  [x] The day of surgery you will be greeted and checked in by the [facility] [employer] [facility].  In addition, you will be registered in the [facility] by a Patient Access Representative. Please bring your photo ID and insurance card. A nurse will greet you in accordance to the time you are needed in the pre-op area to prepare you for surgery. Please do not be discouraged if you are not greeted in the order you arrive as there are many variables that are involved in patient preparation. Your patience is greatly appreciated as you wait for your nurse. Please bring in items such as: books, magazines, newspapers, electronics, or any other items  to occupy your time in the waiting area. [x]  Delays may occur with surgery and staff will make a sincere effort to keep you informed of delays. If any delays occur with your procedure, we apologize ahead of time for your inconvenience as we recognize the value of your time.

## 2020-07-09 ENCOUNTER — ANESTHESIA EVENT (OUTPATIENT)
Dept: OPERATING ROOM | Age: 70
End: 2020-07-09
Payer: COMMERCIAL

## 2020-07-09 NOTE — PROGRESS NOTES
Breanna Mar at [facility] aware of surgery time change to 0730 with arrival time 0530.  States pt will be picked up at facility at [phone]

## 2020-07-10 ENCOUNTER — HOSPITAL ENCOUNTER (OUTPATIENT)
Age: 70
Setting detail: OUTPATIENT SURGERY
Discharge: NURSING, REHAB OR LONG-TERM CARE | End: 2020-07-10
Attending: DENTIST | Admitting: DENTIST
Payer: COMMERCIAL

## 2020-07-10 ENCOUNTER — ANESTHESIA (OUTPATIENT)
Dept: OPERATING ROOM | Age: 70
End: 2020-07-10
Payer: COMMERCIAL

## 2020-07-10 VITALS
OXYGEN SATURATION: 100 % | RESPIRATION RATE: 10 BRPM | SYSTOLIC BLOOD PRESSURE: 178 MMHG | TEMPERATURE: 96.1 F | DIASTOLIC BLOOD PRESSURE: 101 MMHG

## 2020-07-10 VITALS
TEMPERATURE: 97.3 F | DIASTOLIC BLOOD PRESSURE: 74 MMHG | WEIGHT: 160 LBS | SYSTOLIC BLOOD PRESSURE: 146 MMHG | HEIGHT: 72 IN | RESPIRATION RATE: 19 BRPM | OXYGEN SATURATION: 92 % | BODY MASS INDEX: 21.67 KG/M2 | HEART RATE: 93 BPM

## 2020-07-10 PROBLEM — K05.6 PERIODONTAL DISEASE: Chronic | Status: ACTIVE | Noted: 2020-07-10

## 2020-07-10 PROBLEM — K04.7 DENTAL ABSCESS: Chronic | Status: ACTIVE | Noted: 2020-07-10

## 2020-07-10 PROBLEM — K02.9 DENTAL CARIES: Chronic | Status: ACTIVE | Noted: 2020-07-10

## 2020-07-10 LAB
ANION GAP SERPL CALCULATED.3IONS-SCNC: 7 MMOL/L (ref 7–16)
BUN BLDV-MCNC: 14 MG/DL (ref 8–23)
CALCIUM SERPL-MCNC: 9.2 MG/DL (ref 8.6–10.2)
CHLORIDE BLD-SCNC: 104 MMOL/L (ref 98–107)
CO2: 30 MMOL/L (ref 22–29)
CREAT SERPL-MCNC: 1 MG/DL (ref 0.7–1.2)
GFR AFRICAN AMERICAN: >60
GFR NON-AFRICAN AMERICAN: >60 ML/MIN/1.73
GLUCOSE BLD-MCNC: 112 MG/DL (ref 74–99)
HCT VFR BLD CALC: 48 % (ref 37–54)
HEMOGLOBIN: 15.2 G/DL (ref 12.5–16.5)
MCH RBC QN AUTO: 29.1 PG (ref 26–35)
MCHC RBC AUTO-ENTMCNC: 31.7 % (ref 32–34.5)
MCV RBC AUTO: 92 FL (ref 80–99.9)
METER GLUCOSE: 99 MG/DL (ref 74–99)
PDW BLD-RTO: 14.7 FL (ref 11.5–15)
PLATELET # BLD: 212 E9/L (ref 130–450)
PMV BLD AUTO: 10 FL (ref 7–12)
POTASSIUM SERPL-SCNC: 4.2 MMOL/L (ref 3.5–5)
RBC # BLD: 5.22 E12/L (ref 3.8–5.8)
SARS-COV-2, NAAT: NOT DETECTED
SODIUM BLD-SCNC: 141 MMOL/L (ref 132–146)
WBC # BLD: 10.2 E9/L (ref 4.5–11.5)

## 2020-07-10 PROCEDURE — 7100000011 HC PHASE II RECOVERY - ADDTL 15 MIN: Performed by: DENTIST

## 2020-07-10 PROCEDURE — 2580000003 HC RX 258: Performed by: DENTIST

## 2020-07-10 PROCEDURE — 3700000000 HC ANESTHESIA ATTENDED CARE: Performed by: DENTIST

## 2020-07-10 PROCEDURE — 6370000000 HC RX 637 (ALT 250 FOR IP): Performed by: DENTIST

## 2020-07-10 PROCEDURE — 7100000010 HC PHASE II RECOVERY - FIRST 15 MIN: Performed by: DENTIST

## 2020-07-10 PROCEDURE — 6360000002 HC RX W HCPCS: Performed by: NURSE ANESTHETIST, CERTIFIED REGISTERED

## 2020-07-10 PROCEDURE — 7100000000 HC PACU RECOVERY - FIRST 15 MIN: Performed by: DENTIST

## 2020-07-10 PROCEDURE — 36415 COLL VENOUS BLD VENIPUNCTURE: CPT

## 2020-07-10 PROCEDURE — 3600000002 HC SURGERY LEVEL 2 BASE: Performed by: DENTIST

## 2020-07-10 PROCEDURE — 3700000001 HC ADD 15 MINUTES (ANESTHESIA): Performed by: DENTIST

## 2020-07-10 PROCEDURE — 2500000003 HC RX 250 WO HCPCS: Performed by: DENTIST

## 2020-07-10 PROCEDURE — 82962 GLUCOSE BLOOD TEST: CPT

## 2020-07-10 PROCEDURE — U0002 COVID-19 LAB TEST NON-CDC: HCPCS

## 2020-07-10 PROCEDURE — 3600000012 HC SURGERY LEVEL 2 ADDTL 15MIN: Performed by: DENTIST

## 2020-07-10 PROCEDURE — 80048 BASIC METABOLIC PNL TOTAL CA: CPT

## 2020-07-10 PROCEDURE — 85027 COMPLETE CBC AUTOMATED: CPT

## 2020-07-10 PROCEDURE — 7100000001 HC PACU RECOVERY - ADDTL 15 MIN: Performed by: DENTIST

## 2020-07-10 PROCEDURE — 2500000003 HC RX 250 WO HCPCS: Performed by: NURSE ANESTHETIST, CERTIFIED REGISTERED

## 2020-07-10 RX ORDER — LIDOCAINE HYDROCHLORIDE 20 MG/ML
INJECTION, SOLUTION INTRAVENOUS PRN
Status: DISCONTINUED | OUTPATIENT
Start: 2020-07-10 | End: 2020-07-10 | Stop reason: SDUPTHER

## 2020-07-10 RX ORDER — NEOSTIGMINE METHYLSULFATE 1 MG/ML
INJECTION, SOLUTION INTRAVENOUS PRN
Status: DISCONTINUED | OUTPATIENT
Start: 2020-07-10 | End: 2020-07-10 | Stop reason: SDUPTHER

## 2020-07-10 RX ORDER — SODIUM CHLORIDE, SODIUM LACTATE, POTASSIUM CHLORIDE, CALCIUM CHLORIDE 600; 310; 30; 20 MG/100ML; MG/100ML; MG/100ML; MG/100ML
INJECTION, SOLUTION INTRAVENOUS CONTINUOUS
Status: DISCONTINUED | OUTPATIENT
Start: 2020-07-10 | End: 2020-07-10 | Stop reason: HOSPADM

## 2020-07-10 RX ORDER — PHENYLEPHRINE HYDROCHLORIDE 10 MG/ML
INJECTION INTRAVENOUS PRN
Status: DISCONTINUED | OUTPATIENT
Start: 2020-07-10 | End: 2020-07-10 | Stop reason: SDUPTHER

## 2020-07-10 RX ORDER — FENTANYL CITRATE 50 UG/ML
INJECTION, SOLUTION INTRAMUSCULAR; INTRAVENOUS PRN
Status: DISCONTINUED | OUTPATIENT
Start: 2020-07-10 | End: 2020-07-10 | Stop reason: SDUPTHER

## 2020-07-10 RX ORDER — LIDOCAINE HYDROCHLORIDE AND EPINEPHRINE BITARTRATE 20; .01 MG/ML; MG/ML
INJECTION, SOLUTION SUBCUTANEOUS PRN
Status: DISCONTINUED | OUTPATIENT
Start: 2020-07-10 | End: 2020-07-10 | Stop reason: ALTCHOICE

## 2020-07-10 RX ORDER — ONDANSETRON 2 MG/ML
4 INJECTION INTRAMUSCULAR; INTRAVENOUS
Status: DISCONTINUED | OUTPATIENT
Start: 2020-07-10 | End: 2020-07-10 | Stop reason: HOSPADM

## 2020-07-10 RX ORDER — SODIUM CHLORIDE 0.9 % (FLUSH) 0.9 %
10 SYRINGE (ML) INJECTION PRN
Status: DISCONTINUED | OUTPATIENT
Start: 2020-07-10 | End: 2020-07-10 | Stop reason: HOSPADM

## 2020-07-10 RX ORDER — ROCURONIUM BROMIDE 10 MG/ML
INJECTION, SOLUTION INTRAVENOUS PRN
Status: DISCONTINUED | OUTPATIENT
Start: 2020-07-10 | End: 2020-07-10 | Stop reason: SDUPTHER

## 2020-07-10 RX ORDER — SODIUM CHLORIDE 0.9 % (FLUSH) 0.9 %
10 SYRINGE (ML) INJECTION EVERY 12 HOURS SCHEDULED
Status: DISCONTINUED | OUTPATIENT
Start: 2020-07-10 | End: 2020-07-10 | Stop reason: HOSPADM

## 2020-07-10 RX ORDER — ACETAMINOPHEN 500 MG
500 TABLET ORAL EVERY 6 HOURS PRN
Qty: 20 TABLET | Refills: 0 | Status: SHIPPED | OUTPATIENT
Start: 2020-07-10 | End: 2020-07-15

## 2020-07-10 RX ORDER — MORPHINE SULFATE 2 MG/ML
2 INJECTION, SOLUTION INTRAMUSCULAR; INTRAVENOUS EVERY 5 MIN PRN
Status: DISCONTINUED | OUTPATIENT
Start: 2020-07-10 | End: 2020-07-10 | Stop reason: HOSPADM

## 2020-07-10 RX ORDER — ONDANSETRON 2 MG/ML
INJECTION INTRAMUSCULAR; INTRAVENOUS PRN
Status: DISCONTINUED | OUTPATIENT
Start: 2020-07-10 | End: 2020-07-10 | Stop reason: SDUPTHER

## 2020-07-10 RX ORDER — IBUPROFEN 600 MG/1
600 TABLET ORAL EVERY 6 HOURS PRN
Qty: 28 TABLET | Refills: 0 | Status: ON HOLD | OUTPATIENT
Start: 2020-07-10 | End: 2021-01-01

## 2020-07-10 RX ORDER — PROPOFOL 10 MG/ML
INJECTION, EMULSION INTRAVENOUS PRN
Status: DISCONTINUED | OUTPATIENT
Start: 2020-07-10 | End: 2020-07-10 | Stop reason: SDUPTHER

## 2020-07-10 RX ORDER — PROMETHAZINE HYDROCHLORIDE 25 MG/ML
6.25 INJECTION, SOLUTION INTRAMUSCULAR; INTRAVENOUS
Status: DISCONTINUED | OUTPATIENT
Start: 2020-07-10 | End: 2020-07-10 | Stop reason: HOSPADM

## 2020-07-10 RX ORDER — AMOXICILLIN 500 MG/1
500 CAPSULE ORAL 3 TIMES DAILY
Qty: 21 CAPSULE | Refills: 0 | Status: SHIPPED | OUTPATIENT
Start: 2020-07-10 | End: 2020-07-17

## 2020-07-10 RX ORDER — MORPHINE SULFATE 2 MG/ML
1 INJECTION, SOLUTION INTRAMUSCULAR; INTRAVENOUS EVERY 5 MIN PRN
Status: DISCONTINUED | OUTPATIENT
Start: 2020-07-10 | End: 2020-07-10 | Stop reason: HOSPADM

## 2020-07-10 RX ORDER — MEPERIDINE HYDROCHLORIDE 25 MG/ML
12.5 INJECTION INTRAMUSCULAR; INTRAVENOUS; SUBCUTANEOUS EVERY 5 MIN PRN
Status: DISCONTINUED | OUTPATIENT
Start: 2020-07-10 | End: 2020-07-10 | Stop reason: HOSPADM

## 2020-07-10 RX ORDER — GLYCOPYRROLATE 1 MG/5 ML
SYRINGE (ML) INTRAVENOUS PRN
Status: DISCONTINUED | OUTPATIENT
Start: 2020-07-10 | End: 2020-07-10 | Stop reason: SDUPTHER

## 2020-07-10 RX ORDER — DEXAMETHASONE SODIUM PHOSPHATE 10 MG/ML
INJECTION INTRAMUSCULAR; INTRAVENOUS PRN
Status: DISCONTINUED | OUTPATIENT
Start: 2020-07-10 | End: 2020-07-10 | Stop reason: SDUPTHER

## 2020-07-10 RX ADMIN — SODIUM CHLORIDE, POTASSIUM CHLORIDE, SODIUM LACTATE AND CALCIUM CHLORIDE: 600; 310; 30; 20 INJECTION, SOLUTION INTRAVENOUS at 06:35

## 2020-07-10 RX ADMIN — PHENYLEPHRINE HYDROCHLORIDE 100 MCG: 10 INJECTION INTRAVENOUS at 10:27

## 2020-07-10 RX ADMIN — PHENYLEPHRINE HYDROCHLORIDE 100 MCG: 10 INJECTION INTRAVENOUS at 09:16

## 2020-07-10 RX ADMIN — FENTANYL CITRATE 50 MCG: 50 INJECTION, SOLUTION INTRAMUSCULAR; INTRAVENOUS at 08:19

## 2020-07-10 RX ADMIN — ROCURONIUM BROMIDE 30 MG: 10 INJECTION, SOLUTION INTRAVENOUS at 08:10

## 2020-07-10 RX ADMIN — Medication 0.4 MG: at 11:00

## 2020-07-10 RX ADMIN — Medication 3 MG: at 11:00

## 2020-07-10 RX ADMIN — LIDOCAINE HYDROCHLORIDE 60 MG: 20 INJECTION, SOLUTION INTRAVENOUS at 08:10

## 2020-07-10 RX ADMIN — ONDANSETRON HYDROCHLORIDE 4 MG: 2 INJECTION, SOLUTION INTRAMUSCULAR; INTRAVENOUS at 10:49

## 2020-07-10 RX ADMIN — DEXAMETHASONE SODIUM PHOSPHATE 10 MG: 10 INJECTION INTRAMUSCULAR; INTRAVENOUS at 08:10

## 2020-07-10 RX ADMIN — SODIUM CHLORIDE, POTASSIUM CHLORIDE, SODIUM LACTATE AND CALCIUM CHLORIDE: 600; 310; 30; 20 INJECTION, SOLUTION INTRAVENOUS at 09:16

## 2020-07-10 RX ADMIN — PHENYLEPHRINE HYDROCHLORIDE 100 MCG: 10 INJECTION INTRAVENOUS at 09:54

## 2020-07-10 RX ADMIN — ROCURONIUM BROMIDE 10 MG: 10 INJECTION, SOLUTION INTRAVENOUS at 10:03

## 2020-07-10 RX ADMIN — PHENYLEPHRINE HYDROCHLORIDE 100 MCG: 10 INJECTION INTRAVENOUS at 10:35

## 2020-07-10 RX ADMIN — PHENYLEPHRINE HYDROCHLORIDE 100 MCG: 10 INJECTION INTRAVENOUS at 09:11

## 2020-07-10 RX ADMIN — FENTANYL CITRATE 50 MCG: 50 INJECTION, SOLUTION INTRAMUSCULAR; INTRAVENOUS at 08:55

## 2020-07-10 RX ADMIN — FENTANYL CITRATE 50 MCG: 50 INJECTION, SOLUTION INTRAMUSCULAR; INTRAVENOUS at 10:03

## 2020-07-10 RX ADMIN — PROPOFOL 100 MG: 10 INJECTION, EMULSION INTRAVENOUS at 08:10

## 2020-07-10 RX ADMIN — PHENYLEPHRINE HYDROCHLORIDE 100 MCG: 10 INJECTION INTRAVENOUS at 08:42

## 2020-07-10 RX ADMIN — FENTANYL CITRATE 50 MCG: 50 INJECTION, SOLUTION INTRAMUSCULAR; INTRAVENOUS at 08:10

## 2020-07-10 RX ADMIN — ROCURONIUM BROMIDE 10 MG: 10 INJECTION, SOLUTION INTRAVENOUS at 08:38

## 2020-07-10 RX ADMIN — PHENYLEPHRINE HYDROCHLORIDE 100 MCG: 10 INJECTION INTRAVENOUS at 10:18

## 2020-07-10 ASSESSMENT — PAIN SCALES - PAIN ASSESSMENT IN ADVANCED DEMENTIA (PAINAD)
TOTALSCORE: 1
TOTALSCORE: 0
CONSOLABILITY: 0
NEGVOCALIZATION: 1
NEGVOCALIZATION: 0
FACIALEXPRESSION: 0
TOTALSCORE: 0
BODYLANGUAGE: 0
CONSOLABILITY: 0
BODYLANGUAGE: 0
BODYLANGUAGE: 0
CONSOLABILITY: 0
FACIALEXPRESSION: 0
BREATHING: 0
NEGVOCALIZATION: 0
BREATHING: 0
NEGVOCALIZATION: 1
CONSOLABILITY: 0
FACIALEXPRESSION: 0
FACIALEXPRESSION: 0
BODYLANGUAGE: 0
BREATHING: 0
NEGVOCALIZATION: 0
BREATHING: 0
TOTALSCORE: 0
NEGVOCALIZATION: 0
BREATHING: 0
BODYLANGUAGE: 0
FACIALEXPRESSION: 0
CONSOLABILITY: 0
TOTALSCORE: 0
FACIALEXPRESSION: 0
BODYLANGUAGE: 0
FACIALEXPRESSION: 0
CONSOLABILITY: 0
BREATHING: 0
NEGVOCALIZATION: 0
BREATHING: 0
BODYLANGUAGE: 0
TOTALSCORE: 1
CONSOLABILITY: 0
TOTALSCORE: 0

## 2020-07-10 ASSESSMENT — PULMONARY FUNCTION TESTS
PIF_VALUE: 21
PIF_VALUE: 20
PIF_VALUE: 21
PIF_VALUE: 30
PIF_VALUE: 25
PIF_VALUE: 21
PIF_VALUE: 26
PIF_VALUE: 21
PIF_VALUE: 20
PIF_VALUE: 21
PIF_VALUE: 21
PIF_VALUE: 15
PIF_VALUE: 22
PIF_VALUE: 28
PIF_VALUE: 20
PIF_VALUE: 21
PIF_VALUE: 20
PIF_VALUE: 1
PIF_VALUE: 18
PIF_VALUE: 28
PIF_VALUE: 21
PIF_VALUE: 22
PIF_VALUE: 20
PIF_VALUE: 20
PIF_VALUE: 0
PIF_VALUE: 24
PIF_VALUE: 19
PIF_VALUE: 22
PIF_VALUE: 26
PIF_VALUE: 20
PIF_VALUE: 19
PIF_VALUE: 0
PIF_VALUE: 19
PIF_VALUE: 20
PIF_VALUE: 22
PIF_VALUE: 21
PIF_VALUE: 19
PIF_VALUE: 21
PIF_VALUE: 0
PIF_VALUE: 19
PIF_VALUE: 19
PIF_VALUE: 24
PIF_VALUE: 20
PIF_VALUE: 21
PIF_VALUE: 20
PIF_VALUE: 20
PIF_VALUE: 21
PIF_VALUE: 21
PIF_VALUE: 19
PIF_VALUE: 20
PIF_VALUE: 21
PIF_VALUE: 24
PIF_VALUE: 20
PIF_VALUE: 20
PIF_VALUE: 22
PIF_VALUE: 21
PIF_VALUE: 22
PIF_VALUE: 22
PIF_VALUE: 19
PIF_VALUE: 19
PIF_VALUE: 23
PIF_VALUE: 1
PIF_VALUE: 20
PIF_VALUE: 24
PIF_VALUE: 18
PIF_VALUE: 20
PIF_VALUE: 21
PIF_VALUE: 22
PIF_VALUE: 20
PIF_VALUE: 19
PIF_VALUE: 22
PIF_VALUE: 20
PIF_VALUE: 19
PIF_VALUE: 22
PIF_VALUE: 20
PIF_VALUE: 24
PIF_VALUE: 7
PIF_VALUE: 19
PIF_VALUE: 21
PIF_VALUE: 19
PIF_VALUE: 22
PIF_VALUE: 19
PIF_VALUE: 20
PIF_VALUE: 20
PIF_VALUE: 21
PIF_VALUE: 19
PIF_VALUE: 20
PIF_VALUE: 19
PIF_VALUE: 20
PIF_VALUE: 20
PIF_VALUE: 27
PIF_VALUE: 21
PIF_VALUE: 20
PIF_VALUE: 5
PIF_VALUE: 20
PIF_VALUE: 21
PIF_VALUE: 21
PIF_VALUE: 19
PIF_VALUE: 19
PIF_VALUE: 20
PIF_VALUE: 23
PIF_VALUE: 20
PIF_VALUE: 20
PIF_VALUE: 19
PIF_VALUE: 19
PIF_VALUE: 20
PIF_VALUE: 19
PIF_VALUE: 26
PIF_VALUE: 20
PIF_VALUE: 22
PIF_VALUE: 20
PIF_VALUE: 15
PIF_VALUE: 20
PIF_VALUE: 22
PIF_VALUE: 19
PIF_VALUE: 21
PIF_VALUE: 27
PIF_VALUE: 15
PIF_VALUE: 23
PIF_VALUE: 21
PIF_VALUE: 20
PIF_VALUE: 23
PIF_VALUE: 19
PIF_VALUE: 19
PIF_VALUE: 26
PIF_VALUE: 19
PIF_VALUE: 20
PIF_VALUE: 11
PIF_VALUE: 1
PIF_VALUE: 21
PIF_VALUE: 22
PIF_VALUE: 21
PIF_VALUE: 21
PIF_VALUE: 20
PIF_VALUE: 20
PIF_VALUE: 22
PIF_VALUE: 19
PIF_VALUE: 20
PIF_VALUE: 22
PIF_VALUE: 20
PIF_VALUE: 21
PIF_VALUE: 24
PIF_VALUE: 0
PIF_VALUE: 21
PIF_VALUE: 21
PIF_VALUE: 0
PIF_VALUE: 27
PIF_VALUE: 23
PIF_VALUE: 20
PIF_VALUE: 22
PIF_VALUE: 21
PIF_VALUE: 26
PIF_VALUE: 23
PIF_VALUE: 24
PIF_VALUE: 20
PIF_VALUE: 21
PIF_VALUE: 19
PIF_VALUE: 18
PIF_VALUE: 20
PIF_VALUE: 27
PIF_VALUE: 21
PIF_VALUE: 26
PIF_VALUE: 22
PIF_VALUE: 22
PIF_VALUE: 20
PIF_VALUE: 16

## 2020-07-10 ASSESSMENT — PAIN - FUNCTIONAL ASSESSMENT: PAIN_FUNCTIONAL_ASSESSMENT: ADVANCED DEMENTIA

## 2020-07-10 NOTE — PROGRESS NOTES
Covid test done via right nares and sent to lab @ 0607    Pt in isolation for MDRO / urine; has hickey. Urine with sediment. Consent obtained from legal guardian who did not stay. Pt with diminished capacity, but giving instructions to pt ans RN cares for him.           UGO Pitt RN

## 2020-07-10 NOTE — BRIEF OP NOTE
Brief Postoperative Note      Patient: Fauzia Olivo  YOB: 1950  MRN: [id number]    Date of Procedure: 7/10/2020    Pre-Op Diagnosis: Fractured teeth    Post-Op Diagnosis: Caries, abscess, periodontal disease       Procedure(s):  DENTAL RADIOGRAPHS, RESTORATIONS, EXTRACTIONS X 14    Surgeon(s):  CHRISSY Servin DDS Reno Munro, DDS    Assistant:  DAYO Dempsey    Anesthesia: General EOTT    Estimated Blood Loss (mL): less than 10 mL    Complications: None    Specimens:   * No specimens in log *    Implants:  * No implants in log *      Drains:   Urethral Catheter Straight-tip 16 fr (Active)       Findings: Multiple fractured teeth that were non-restorable, generalized periodontal disease    Electronically signed by Ignacio Nunez DDS on 7/10/2020 at 11:13 AM

## 2020-07-10 NOTE — ANESTHESIA PRE PROCEDURE
Past Surgical History:  History reviewed. No pertinent surgical history. Social History:    Social History     Tobacco Use    Smoking status: Never Smoker    Smokeless tobacco: Never Used   Substance Use Topics    Alcohol use: No                                Counseling given: Not Answered      Vital Signs (Current):   Vitals:    07/02/20 1030 07/10/20 0704   BP:  (!) 148/87   Pulse:  68   Resp:  18   Temp:  36.1 °C (97 °F)   TempSrc:  Temporal   SpO2:  100%   Weight: 160 lb (72.6 kg) 160 lb (72.6 kg)   Height: 6' (1.829 m) 6' (1.829 m)                                              BP Readings from Last 3 Encounters:   07/10/20 (!) 148/87   09/13/19 120/80   09/01/19 123/61       NPO Status: Time of last liquid consumption: 2359                        Time of last solid consumption: 1800                        Date of last liquid consumption: 07/09/20                        Date of last solid food consumption: 07/09/20    BMI:   Wt Readings from Last 3 Encounters:   07/10/20 160 lb (72.6 kg)   09/01/19 203 lb 1.6 oz (92.1 kg)   08/18/19 196 lb 11.2 oz (89.2 kg)     Body mass index is 21.7 kg/m². CBC:   Lab Results   Component Value Date    WBC 10.2 07/10/2020    RBC 5.22 07/10/2020    HGB 15.2 07/10/2020    HCT 48.0 07/10/2020    MCV 92.0 07/10/2020    RDW 14.7 07/10/2020     07/10/2020       CMP:   Lab Results   Component Value Date     07/10/2020    K 4.2 07/10/2020     07/10/2020    CO2 30 07/10/2020    BUN 14 07/10/2020    CREATININE 1.0 07/10/2020    GFRAA >60 07/10/2020    LABGLOM >60 07/10/2020    GLUCOSE 112 07/10/2020    PROT 7.0 08/23/2019    CALCIUM 9.2 07/10/2020    BILITOT 0.3 09/03/2019    ALKPHOS 79 09/03/2019    AST 23 09/03/2019    ALT 17 09/03/2019       POC Tests: No results for input(s): POCGLU, POCNA, POCK, POCCL, POCBUN, POCHEMO, POCHCT in the last 72 hours.     Coags: No results found for: PROTIME, INR, APTT    HCG (If Applicable): No results found for: PREGTESTUR, PREGSERUM, HCG, HCGQUANT     ABGs: No results found for: PHART, PO2ART, HHJ5AQZ, APW1ASP, BEART, I3GUFOEU     Type & Screen (If Applicable):  No results found for: LABABO, LABRH    Drug/Infectious Status (If Applicable):  No results found for: HIV, HEPCAB    COVID-19 Screening (If Applicable):   Lab Results   Component Value Date    COVID19 Not Detected 07/10/2020     EKG 8/18/2019  Narrative & Impression     Sinus tachycardia  Otherwise normal ECG  No previous ECGs available  Confirmed by Michelle Sequeira ([id number]) on 8/20/2019 1:53:43 PM         Anesthesia Evaluation  Patient summary reviewed and Nursing notes reviewed  Airway: Mallampati: Unable to assess / NA       Comment: Unable to assess, pt does not follow commands. Dental:      Comment: KRISTAN    Pulmonary: breath sounds clear to auscultation                             Cardiovascular:            Rhythm: regular  Rate: normal                 ROS comment: Endocarditis     Neuro/Psych:   (+) psychiatric history:             ROS comment: Alzheimer's dementia (HCC)  Hallucinations GI/Hepatic/Renal:             Endo/Other:    (+) DiabetesType II DM, , .                  ROS comment: Prostate enlargement Abdominal:           Vascular:                                      Anesthesia Plan      general     ASA 3       Induction: intravenous. MIPS: Prophylactic antiemetics administered. Plan/risks discussed with: Patient unable to participate due to Alzheimer's dementia       Plan discussed with CRNA and attending. Myra Call RN   7/10/2020    DOS STAFF ADDENDUM:    Pt seen and examined, physical exam updated, chart reviewed including anesthesia, drug and allergy history. H&P reviewed. No interval changes to history or physical examination (unless noted above). NPO status confirmed. Pt. Unable to provide any medical hx. No family present to speak with. Will proceed with GA.     Scooby Granda DO  Staff

## 2020-07-10 NOTE — INTERVAL H&P NOTE
Update History & Physical    The patient's History and Physical of June 22, treatment plan was reviewed with the patient and I examined the patient. There was no change. The surgical site was confirmed by the patient and me. Plan: The risks, benefits, expected outcome, and alternative to the recommended procedure have been discussed with the patient. Patient understands and wants to proceed with the procedure.      Electronically signed by Kathryn Glover DDS on 7/10/2020 at 7:30 AM

## 2020-07-10 NOTE — OP NOTE
Date of Procedure: 7/10/2020     Surgeon: Michael Maynard DDS      Surgical Wound Classification: Clean Contaminated II    Preoperative Diagnosis: Fractured teeth     Postoperative Diagnosis: abscess, dental caries, periodontal disease    Operation: Exam, Prophy, Fluoride Treatment, Restorative:none Extractions: x14    Anesthesia: General EOTT    Estimated Blood Loss: <49 mL    Complications:  none    Fluids: 1600 mL    Specimen: none    Conditions:  good    Disposition: To PACU, stable    Procedure: This patient was initially seen in the preoperative holding area, where the history, physical and consents were updated and verified. The patient was then transferred via the anesthesia team and [facility] to operating room # 10 at [facility] on 7/10/2020 , at which time the patient was transferred from the [facility] onto the operating room table and placed in the supine position. The patient's arms and legs were padded at the sides. The patient had the general anesthetic monitors applied. Please see anesthesia notes for complete details. Once the patient was placed into a general anesthetic state, the surgical area was prepped and draped in a standard oral and maxillofacial surgery fashion. A throat pack was placed. A comprehensive oral exam was performed. 18 radiographs were taken. A treatment plan was formulated based upon presenting clinical and radiographic findings. No caries identified. A prophylaxis with pumice and SRP of remaining teeth was performed. Full mouth perio charting was completed. Fluoride and peridex applied. Surgical procedure was initiated. Using 1.5 length 27-gauge needle, and 5.1 mL of 2% lidocaine with 1:100,000 epinephrine. Utilizing proper surgical instruments and techniques, the following teeth were removed:  3, 4, 5, 13, 14, 15, 18, 19, 23, 24, 28, 29, 30, and 31.   Teeth were removed due to caries or severe periodontal disease supported by clinical and

## 2020-07-10 NOTE — PROGRESS NOTES
Called [facility] and gave post-op instructions to nurse taking care of patient. All questions addressed.

## 2020-07-10 NOTE — PROGRESS NOTES
Report called to [facility] , all questions answered at this time .   Called for patiens ambulance

## 2021-01-01 ENCOUNTER — APPOINTMENT (OUTPATIENT)
Dept: CT IMAGING | Age: 71
DRG: 698 | End: 2021-01-01
Payer: COMMERCIAL

## 2021-01-01 ENCOUNTER — HOSPITAL ENCOUNTER (INPATIENT)
Age: 71
LOS: 7 days | Discharge: NURSING, REHAB OR LONG-TERM CARE | DRG: 698 | End: 2021-10-20
Attending: EMERGENCY MEDICINE | Admitting: INTERNAL MEDICINE
Payer: COMMERCIAL

## 2021-01-01 ENCOUNTER — APPOINTMENT (OUTPATIENT)
Dept: GENERAL RADIOLOGY | Age: 71
DRG: 698 | End: 2021-01-01
Payer: COMMERCIAL

## 2021-01-01 VITALS
WEIGHT: 160 LBS | HEART RATE: 110 BPM | TEMPERATURE: 98.1 F | HEIGHT: 72 IN | OXYGEN SATURATION: 97 % | DIASTOLIC BLOOD PRESSURE: 72 MMHG | SYSTOLIC BLOOD PRESSURE: 126 MMHG | RESPIRATION RATE: 20 BRPM | BODY MASS INDEX: 21.67 KG/M2

## 2021-01-01 DIAGNOSIS — A41.9 SEPSIS, DUE TO UNSPECIFIED ORGANISM, UNSPECIFIED WHETHER ACUTE ORGAN DYSFUNCTION PRESENT (HCC): Primary | ICD-10-CM

## 2021-01-01 DIAGNOSIS — L89.43 PRESSURE INJURY OF CONTIGUOUS REGION INVOLVING BUTTOCK AND HIP, STAGE 3, UNSPECIFIED LATERALITY (HCC): ICD-10-CM

## 2021-01-01 DIAGNOSIS — N39.0 URINARY TRACT INFECTION ASSOCIATED WITH INDWELLING URETHRAL CATHETER, INITIAL ENCOUNTER (HCC): ICD-10-CM

## 2021-01-01 DIAGNOSIS — T83.511A URINARY TRACT INFECTION ASSOCIATED WITH INDWELLING URETHRAL CATHETER, INITIAL ENCOUNTER (HCC): ICD-10-CM

## 2021-01-01 LAB
ALBUMIN SERPL-MCNC: 2.7 G/DL (ref 3.5–5.2)
ALBUMIN SERPL-MCNC: 2.8 G/DL (ref 3.5–5.2)
ALBUMIN SERPL-MCNC: 2.9 G/DL (ref 3.5–5.2)
ALP BLD-CCNC: 66 U/L (ref 40–129)
ALP BLD-CCNC: 67 U/L (ref 40–129)
ALP BLD-CCNC: 72 U/L (ref 40–129)
ALP BLD-CCNC: 72 U/L (ref 40–129)
ALP BLD-CCNC: 73 U/L (ref 40–129)
ALP BLD-CCNC: 75 U/L (ref 40–129)
ALP BLD-CCNC: 78 U/L (ref 40–129)
ALT SERPL-CCNC: 10 U/L (ref 0–40)
ALT SERPL-CCNC: 11 U/L (ref 0–40)
ALT SERPL-CCNC: 14 U/L (ref 0–40)
ANION GAP SERPL CALCULATED.3IONS-SCNC: 10 MMOL/L (ref 7–16)
ANION GAP SERPL CALCULATED.3IONS-SCNC: 10 MMOL/L (ref 7–16)
ANION GAP SERPL CALCULATED.3IONS-SCNC: 11 MMOL/L (ref 7–16)
ANION GAP SERPL CALCULATED.3IONS-SCNC: 12 MMOL/L (ref 7–16)
ANION GAP SERPL CALCULATED.3IONS-SCNC: 12 MMOL/L (ref 7–16)
ANION GAP SERPL CALCULATED.3IONS-SCNC: 8 MMOL/L (ref 7–16)
ANION GAP SERPL CALCULATED.3IONS-SCNC: 9 MMOL/L (ref 7–16)
AST SERPL-CCNC: 18 U/L (ref 0–39)
AST SERPL-CCNC: 19 U/L (ref 0–39)
AST SERPL-CCNC: 19 U/L (ref 0–39)
AST SERPL-CCNC: 20 U/L (ref 0–39)
AST SERPL-CCNC: 22 U/L (ref 0–39)
AST SERPL-CCNC: 29 U/L (ref 0–39)
AST SERPL-CCNC: 31 U/L (ref 0–39)
BACTERIA: ABNORMAL /HPF
BASOPHILS ABSOLUTE: 0 E9/L (ref 0–0.2)
BASOPHILS ABSOLUTE: 0.01 E9/L (ref 0–0.2)
BASOPHILS ABSOLUTE: 0.02 E9/L (ref 0–0.2)
BASOPHILS ABSOLUTE: 0.04 E9/L (ref 0–0.2)
BASOPHILS ABSOLUTE: 0.05 E9/L (ref 0–0.2)
BASOPHILS RELATIVE PERCENT: 0 % (ref 0–2)
BASOPHILS RELATIVE PERCENT: 0.1 % (ref 0–2)
BASOPHILS RELATIVE PERCENT: 0.2 % (ref 0–2)
BASOPHILS RELATIVE PERCENT: 0.3 % (ref 0–2)
BASOPHILS RELATIVE PERCENT: 0.3 % (ref 0–2)
BASOPHILS RELATIVE PERCENT: 0.4 % (ref 0–2)
BASOPHILS RELATIVE PERCENT: 0.4 % (ref 0–2)
BILIRUB SERPL-MCNC: 0.3 MG/DL (ref 0–1.2)
BILIRUB SERPL-MCNC: 0.4 MG/DL (ref 0–1.2)
BILIRUB SERPL-MCNC: 0.4 MG/DL (ref 0–1.2)
BILIRUB SERPL-MCNC: 0.5 MG/DL (ref 0–1.2)
BILIRUB SERPL-MCNC: 0.6 MG/DL (ref 0–1.2)
BILIRUBIN URINE: NEGATIVE
BLOOD CULTURE, ROUTINE: NORMAL
BLOOD, URINE: ABNORMAL
BUN BLDV-MCNC: 11 MG/DL (ref 6–23)
BUN BLDV-MCNC: 12 MG/DL (ref 6–23)
BUN BLDV-MCNC: 15 MG/DL (ref 6–23)
BUN BLDV-MCNC: 16 MG/DL (ref 6–23)
BUN BLDV-MCNC: 19 MG/DL (ref 6–23)
BUN BLDV-MCNC: 25 MG/DL (ref 6–23)
BUN BLDV-MCNC: 9 MG/DL (ref 6–23)
CALCIUM SERPL-MCNC: 8.6 MG/DL (ref 8.6–10.2)
CALCIUM SERPL-MCNC: 9 MG/DL (ref 8.6–10.2)
CALCIUM SERPL-MCNC: 9.1 MG/DL (ref 8.6–10.2)
CALCIUM SERPL-MCNC: 9.1 MG/DL (ref 8.6–10.2)
CALCIUM SERPL-MCNC: 9.4 MG/DL (ref 8.6–10.2)
CALCIUM SERPL-MCNC: 9.4 MG/DL (ref 8.6–10.2)
CALCIUM SERPL-MCNC: 9.9 MG/DL (ref 8.6–10.2)
CHLORIDE BLD-SCNC: 102 MMOL/L (ref 98–107)
CHLORIDE BLD-SCNC: 107 MMOL/L (ref 98–107)
CHLORIDE BLD-SCNC: 107 MMOL/L (ref 98–107)
CHLORIDE BLD-SCNC: 109 MMOL/L (ref 98–107)
CHLORIDE BLD-SCNC: 111 MMOL/L (ref 98–107)
CHLORIDE BLD-SCNC: 112 MMOL/L (ref 98–107)
CHLORIDE BLD-SCNC: 112 MMOL/L (ref 98–107)
CLARITY: ABNORMAL
CO2: 23 MMOL/L (ref 22–29)
CO2: 24 MMOL/L (ref 22–29)
CO2: 24 MMOL/L (ref 22–29)
CO2: 25 MMOL/L (ref 22–29)
CO2: 25 MMOL/L (ref 22–29)
CO2: 26 MMOL/L (ref 22–29)
CO2: 26 MMOL/L (ref 22–29)
COLOR: YELLOW
CREAT SERPL-MCNC: 0.7 MG/DL (ref 0.7–1.2)
CREAT SERPL-MCNC: 0.8 MG/DL (ref 0.7–1.2)
CREAT SERPL-MCNC: 1 MG/DL (ref 0.7–1.2)
CREAT SERPL-MCNC: 1 MG/DL (ref 0.7–1.2)
CRYSTALS, UA: ABNORMAL /HPF
CRYSTALS, UA: ABNORMAL /HPF
CULTURE, BLOOD 2: NORMAL
EKG ATRIAL RATE: 114 BPM
EKG P AXIS: 40 DEGREES
EKG P-R INTERVAL: 128 MS
EKG Q-T INTERVAL: 336 MS
EKG QRS DURATION: 70 MS
EKG QTC CALCULATION (BAZETT): 463 MS
EKG R AXIS: 38 DEGREES
EKG T AXIS: 24 DEGREES
EKG VENTRICULAR RATE: 114 BPM
EOSINOPHILS ABSOLUTE: 0.04 E9/L (ref 0.05–0.5)
EOSINOPHILS ABSOLUTE: 0.04 E9/L (ref 0.05–0.5)
EOSINOPHILS ABSOLUTE: 0.11 E9/L (ref 0.05–0.5)
EOSINOPHILS ABSOLUTE: 0.12 E9/L (ref 0.05–0.5)
EOSINOPHILS ABSOLUTE: 0.16 E9/L (ref 0.05–0.5)
EOSINOPHILS ABSOLUTE: 0.17 E9/L (ref 0.05–0.5)
EOSINOPHILS ABSOLUTE: 0.22 E9/L (ref 0.05–0.5)
EOSINOPHILS RELATIVE PERCENT: 0.3 % (ref 0–6)
EOSINOPHILS RELATIVE PERCENT: 0.4 % (ref 0–6)
EOSINOPHILS RELATIVE PERCENT: 1 % (ref 0–6)
EOSINOPHILS RELATIVE PERCENT: 1.1 % (ref 0–6)
EOSINOPHILS RELATIVE PERCENT: 1.2 % (ref 0–6)
EOSINOPHILS RELATIVE PERCENT: 2 % (ref 0–6)
EOSINOPHILS RELATIVE PERCENT: 2.8 % (ref 0–6)
GFR AFRICAN AMERICAN: >60
GFR NON-AFRICAN AMERICAN: >60 ML/MIN/1.73
GLUCOSE BLD-MCNC: 105 MG/DL (ref 74–99)
GLUCOSE BLD-MCNC: 121 MG/DL (ref 74–99)
GLUCOSE BLD-MCNC: 128 MG/DL (ref 74–99)
GLUCOSE BLD-MCNC: 137 MG/DL (ref 74–99)
GLUCOSE BLD-MCNC: 138 MG/DL (ref 74–99)
GLUCOSE BLD-MCNC: 142 MG/DL (ref 74–99)
GLUCOSE BLD-MCNC: 90 MG/DL (ref 74–99)
GLUCOSE URINE: NEGATIVE MG/DL
HCT VFR BLD CALC: 37.1 % (ref 37–54)
HCT VFR BLD CALC: 39.3 % (ref 37–54)
HCT VFR BLD CALC: 44.7 % (ref 37–54)
HCT VFR BLD CALC: 46.2 % (ref 37–54)
HCT VFR BLD CALC: 48.2 % (ref 37–54)
HCT VFR BLD CALC: 48.4 % (ref 37–54)
HCT VFR BLD CALC: 49.6 % (ref 37–54)
HEMOGLOBIN: 11.7 G/DL (ref 12.5–16.5)
HEMOGLOBIN: 12 G/DL (ref 12.5–16.5)
HEMOGLOBIN: 13.6 G/DL (ref 12.5–16.5)
HEMOGLOBIN: 13.7 G/DL (ref 12.5–16.5)
HEMOGLOBIN: 13.8 G/DL (ref 12.5–16.5)
HEMOGLOBIN: 14 G/DL (ref 12.5–16.5)
HEMOGLOBIN: 14.8 G/DL (ref 12.5–16.5)
IMMATURE GRANULOCYTES #: 0.03 E9/L
IMMATURE GRANULOCYTES #: 0.06 E9/L
IMMATURE GRANULOCYTES #: 0.07 E9/L
IMMATURE GRANULOCYTES #: 0.07 E9/L
IMMATURE GRANULOCYTES #: 0.1 E9/L
IMMATURE GRANULOCYTES #: 0.1 E9/L
IMMATURE GRANULOCYTES %: 0.3 % (ref 0–5)
IMMATURE GRANULOCYTES %: 0.6 % (ref 0–5)
IMMATURE GRANULOCYTES %: 0.7 % (ref 0–5)
IMMATURE GRANULOCYTES %: 0.8 % (ref 0–5)
KETONES, URINE: NEGATIVE MG/DL
LACTIC ACID, SEPSIS: 1.2 MMOL/L (ref 0.5–1.9)
LACTIC ACID, SEPSIS: 2.2 MMOL/L (ref 0.5–1.9)
LACTIC ACID: 0.7 MMOL/L (ref 0.5–2.2)
LEUKOCYTE ESTERASE, URINE: ABNORMAL
LIPASE: 45 U/L (ref 13–60)
LYMPHOCYTES ABSOLUTE: 1.09 E9/L (ref 1.5–4)
LYMPHOCYTES ABSOLUTE: 1.17 E9/L (ref 1.5–4)
LYMPHOCYTES ABSOLUTE: 1.27 E9/L (ref 1.5–4)
LYMPHOCYTES ABSOLUTE: 1.58 E9/L (ref 1.5–4)
LYMPHOCYTES ABSOLUTE: 1.62 E9/L (ref 1.5–4)
LYMPHOCYTES ABSOLUTE: 1.64 E9/L (ref 1.5–4)
LYMPHOCYTES ABSOLUTE: 2.6 E9/L (ref 1.5–4)
LYMPHOCYTES RELATIVE PERCENT: 11.4 % (ref 20–42)
LYMPHOCYTES RELATIVE PERCENT: 11.9 % (ref 20–42)
LYMPHOCYTES RELATIVE PERCENT: 12.5 % (ref 20–42)
LYMPHOCYTES RELATIVE PERCENT: 13 % (ref 20–42)
LYMPHOCYTES RELATIVE PERCENT: 13.7 % (ref 20–42)
LYMPHOCYTES RELATIVE PERCENT: 19.5 % (ref 20–42)
LYMPHOCYTES RELATIVE PERCENT: 20.6 % (ref 20–42)
MCH RBC QN AUTO: 27.9 PG (ref 26–35)
MCH RBC QN AUTO: 28.1 PG (ref 26–35)
MCH RBC QN AUTO: 28.3 PG (ref 26–35)
MCH RBC QN AUTO: 28.3 PG (ref 26–35)
MCH RBC QN AUTO: 28.4 PG (ref 26–35)
MCH RBC QN AUTO: 28.6 PG (ref 26–35)
MCH RBC QN AUTO: 28.6 PG (ref 26–35)
MCHC RBC AUTO-ENTMCNC: 27.8 % (ref 32–34.5)
MCHC RBC AUTO-ENTMCNC: 29 % (ref 32–34.5)
MCHC RBC AUTO-ENTMCNC: 29.4 % (ref 32–34.5)
MCHC RBC AUTO-ENTMCNC: 30.5 % (ref 32–34.5)
MCHC RBC AUTO-ENTMCNC: 30.6 % (ref 32–34.5)
MCHC RBC AUTO-ENTMCNC: 30.6 % (ref 32–34.5)
MCHC RBC AUTO-ENTMCNC: 31.5 % (ref 32–34.5)
MCV RBC AUTO: 102.7 FL (ref 80–99.9)
MCV RBC AUTO: 90.7 FL (ref 80–99.9)
MCV RBC AUTO: 91 FL (ref 80–99.9)
MCV RBC AUTO: 92 FL (ref 80–99.9)
MCV RBC AUTO: 92.5 FL (ref 80–99.9)
MCV RBC AUTO: 96.5 FL (ref 80–99.9)
MCV RBC AUTO: 97.6 FL (ref 80–99.9)
METAMYELOCYTES RELATIVE PERCENT: 6 % (ref 0–1)
MONOCYTES ABSOLUTE: 0.25 E9/L (ref 0.1–0.95)
MONOCYTES ABSOLUTE: 0.46 E9/L (ref 0.1–0.95)
MONOCYTES ABSOLUTE: 0.51 E9/L (ref 0.1–0.95)
MONOCYTES ABSOLUTE: 0.53 E9/L (ref 0.1–0.95)
MONOCYTES ABSOLUTE: 0.66 E9/L (ref 0.1–0.95)
MONOCYTES ABSOLUTE: 0.95 E9/L (ref 0.1–0.95)
MONOCYTES ABSOLUTE: 1.1 E9/L (ref 0.1–0.95)
MONOCYTES RELATIVE PERCENT: 3 % (ref 2–12)
MONOCYTES RELATIVE PERCENT: 5 % (ref 2–12)
MONOCYTES RELATIVE PERCENT: 5.2 % (ref 2–12)
MONOCYTES RELATIVE PERCENT: 5.6 % (ref 2–12)
MONOCYTES RELATIVE PERCENT: 5.8 % (ref 2–12)
MONOCYTES RELATIVE PERCENT: 7.2 % (ref 2–12)
MONOCYTES RELATIVE PERCENT: 8.3 % (ref 2–12)
MYELOCYTE PERCENT: 32 % (ref 0–0)
NEUTROPHILS ABSOLUTE: 10.55 E9/L (ref 1.8–7.3)
NEUTROPHILS ABSOLUTE: 5.58 E9/L (ref 1.8–7.3)
NEUTROPHILS ABSOLUTE: 6.89 E9/L (ref 1.8–7.3)
NEUTROPHILS ABSOLUTE: 8.24 E9/L (ref 1.8–7.3)
NEUTROPHILS ABSOLUTE: 8.35 E9/L (ref 1.8–7.3)
NEUTROPHILS ABSOLUTE: 9.32 E9/L (ref 1.8–7.3)
NEUTROPHILS ABSOLUTE: 9.32 E9/L (ref 1.8–7.3)
NEUTROPHILS RELATIVE PERCENT: 44 % (ref 43–80)
NEUTROPHILS RELATIVE PERCENT: 69.8 % (ref 43–80)
NEUTROPHILS RELATIVE PERCENT: 69.9 % (ref 43–80)
NEUTROPHILS RELATIVE PERCENT: 78.8 % (ref 43–80)
NEUTROPHILS RELATIVE PERCENT: 79.5 % (ref 43–80)
NEUTROPHILS RELATIVE PERCENT: 81.4 % (ref 43–80)
NEUTROPHILS RELATIVE PERCENT: 81.4 % (ref 43–80)
NITRITE, URINE: POSITIVE
PDW BLD-RTO: 16 FL (ref 11.5–15)
PDW BLD-RTO: 16.1 FL (ref 11.5–15)
PDW BLD-RTO: 16.2 FL (ref 11.5–15)
PDW BLD-RTO: 16.3 FL (ref 11.5–15)
PDW BLD-RTO: 16.4 FL (ref 11.5–15)
PDW BLD-RTO: 16.5 FL (ref 11.5–15)
PDW BLD-RTO: 16.8 FL (ref 11.5–15)
PH UA: 8 (ref 5–9)
PLATELET # BLD: 241 E9/L (ref 130–450)
PLATELET # BLD: 254 E9/L (ref 130–450)
PLATELET # BLD: 272 E9/L (ref 130–450)
PLATELET # BLD: 303 E9/L (ref 130–450)
PLATELET # BLD: 303 E9/L (ref 130–450)
PLATELET # BLD: 319 E9/L (ref 130–450)
PLATELET # BLD: 323 E9/L (ref 130–450)
PMV BLD AUTO: 10.2 FL (ref 7–12)
PMV BLD AUTO: 9.4 FL (ref 7–12)
PMV BLD AUTO: 9.5 FL (ref 7–12)
PMV BLD AUTO: 9.6 FL (ref 7–12)
PMV BLD AUTO: 9.6 FL (ref 7–12)
PMV BLD AUTO: 9.7 FL (ref 7–12)
PMV BLD AUTO: 9.8 FL (ref 7–12)
POTASSIUM REFLEX MAGNESIUM: 4.6 MMOL/L (ref 3.5–5)
POTASSIUM SERPL-SCNC: 3.5 MMOL/L (ref 3.5–5)
POTASSIUM SERPL-SCNC: 3.6 MMOL/L (ref 3.5–5)
POTASSIUM SERPL-SCNC: 3.9 MMOL/L (ref 3.5–5)
POTASSIUM SERPL-SCNC: 4.3 MMOL/L (ref 3.5–5)
PROTEIN UA: 30 MG/DL
RBC # BLD: 4.09 E12/L (ref 3.8–5.8)
RBC # BLD: 4.27 E12/L (ref 3.8–5.8)
RBC # BLD: 4.79 E12/L (ref 3.8–5.8)
RBC # BLD: 4.83 E12/L (ref 3.8–5.8)
RBC # BLD: 4.91 E12/L (ref 3.8–5.8)
RBC # BLD: 4.94 E12/L (ref 3.8–5.8)
RBC # BLD: 5.23 E12/L (ref 3.8–5.8)
RBC # BLD: NORMAL 10*6/UL
RBC UA: ABNORMAL /HPF (ref 0–2)
REASON FOR REJECTION: NORMAL
REJECTED TEST: NORMAL
SARS-COV-2, NAAT: NOT DETECTED
SARS-COV-2, NAAT: NOT DETECTED
SODIUM BLD-SCNC: 138 MMOL/L (ref 132–146)
SODIUM BLD-SCNC: 140 MMOL/L (ref 132–146)
SODIUM BLD-SCNC: 142 MMOL/L (ref 132–146)
SODIUM BLD-SCNC: 144 MMOL/L (ref 132–146)
SODIUM BLD-SCNC: 145 MMOL/L (ref 132–146)
SODIUM BLD-SCNC: 147 MMOL/L (ref 132–146)
SODIUM BLD-SCNC: 149 MMOL/L (ref 132–146)
SPECIFIC GRAVITY UA: 1.02 (ref 1–1.03)
TOTAL PROTEIN: 6.1 G/DL (ref 6.4–8.3)
TOTAL PROTEIN: 6.1 G/DL (ref 6.4–8.3)
TOTAL PROTEIN: 6.9 G/DL (ref 6.4–8.3)
TOTAL PROTEIN: 6.9 G/DL (ref 6.4–8.3)
TOTAL PROTEIN: 7 G/DL (ref 6.4–8.3)
TOTAL PROTEIN: 7 G/DL (ref 6.4–8.3)
TOTAL PROTEIN: 7.1 G/DL (ref 6.4–8.3)
URINE CULTURE, ROUTINE: NORMAL
UROBILINOGEN, URINE: 0.2 E.U./DL
WBC # BLD: 10.1 E9/L (ref 4.5–11.5)
WBC # BLD: 10.3 E9/L (ref 4.5–11.5)
WBC # BLD: 11.8 E9/L (ref 4.5–11.5)
WBC # BLD: 13.3 E9/L (ref 4.5–11.5)
WBC # BLD: 13.3 E9/L (ref 4.5–11.5)
WBC # BLD: 8 E9/L (ref 4.5–11.5)
WBC # BLD: 8.4 E9/L (ref 4.5–11.5)
WBC UA: >20 /HPF (ref 0–5)

## 2021-01-01 PROCEDURE — 36415 COLL VENOUS BLD VENIPUNCTURE: CPT

## 2021-01-01 PROCEDURE — 2060000000 HC ICU INTERMEDIATE R&B

## 2021-01-01 PROCEDURE — 2580000003 HC RX 258: Performed by: INTERNAL MEDICINE

## 2021-01-01 PROCEDURE — 87040 BLOOD CULTURE FOR BACTERIA: CPT

## 2021-01-01 PROCEDURE — 6360000002 HC RX W HCPCS: Performed by: INTERNAL MEDICINE

## 2021-01-01 PROCEDURE — 85025 COMPLETE CBC W/AUTO DIFF WBC: CPT

## 2021-01-01 PROCEDURE — 6360000004 HC RX CONTRAST MEDICATION: Performed by: RADIOLOGY

## 2021-01-01 PROCEDURE — 80053 COMPREHEN METABOLIC PANEL: CPT

## 2021-01-01 PROCEDURE — 96361 HYDRATE IV INFUSION ADD-ON: CPT

## 2021-01-01 PROCEDURE — 71045 X-RAY EXAM CHEST 1 VIEW: CPT

## 2021-01-01 PROCEDURE — 6370000000 HC RX 637 (ALT 250 FOR IP): Performed by: INTERNAL MEDICINE

## 2021-01-01 PROCEDURE — 81001 URINALYSIS AUTO W/SCOPE: CPT

## 2021-01-01 PROCEDURE — 6370000000 HC RX 637 (ALT 250 FOR IP): Performed by: SPECIALIST

## 2021-01-01 PROCEDURE — 87635 SARS-COV-2 COVID-19 AMP PRB: CPT

## 2021-01-01 PROCEDURE — 87088 URINE BACTERIA CULTURE: CPT

## 2021-01-01 PROCEDURE — 6360000002 HC RX W HCPCS: Performed by: EMERGENCY MEDICINE

## 2021-01-01 PROCEDURE — 2580000003 HC RX 258: Performed by: EMERGENCY MEDICINE

## 2021-01-01 PROCEDURE — 74177 CT ABD & PELVIS W/CONTRAST: CPT

## 2021-01-01 PROCEDURE — 93005 ELECTROCARDIOGRAM TRACING: CPT | Performed by: EMERGENCY MEDICINE

## 2021-01-01 PROCEDURE — 96374 THER/PROPH/DIAG INJ IV PUSH: CPT

## 2021-01-01 PROCEDURE — 99284 EMERGENCY DEPT VISIT MOD MDM: CPT

## 2021-01-01 PROCEDURE — 51702 INSERT TEMP BLADDER CATH: CPT

## 2021-01-01 PROCEDURE — 83605 ASSAY OF LACTIC ACID: CPT

## 2021-01-01 PROCEDURE — 83690 ASSAY OF LIPASE: CPT

## 2021-01-01 RX ORDER — CEFDINIR 300 MG/1
300 CAPSULE ORAL EVERY 12 HOURS SCHEDULED
Qty: 14 CAPSULE | Refills: 0 | Status: SHIPPED | OUTPATIENT
Start: 2021-01-01 | End: 2021-01-01

## 2021-01-01 RX ORDER — SODIUM CHLORIDE 9 MG/ML
INJECTION, SOLUTION INTRAVENOUS CONTINUOUS
Status: DISCONTINUED | OUTPATIENT
Start: 2021-01-01 | End: 2021-01-01

## 2021-01-01 RX ORDER — ACETAMINOPHEN 500 MG
500 TABLET ORAL EVERY 6 HOURS PRN
Status: DISCONTINUED | OUTPATIENT
Start: 2021-01-01 | End: 2021-01-01 | Stop reason: HOSPADM

## 2021-01-01 RX ORDER — MULTIVIT WITH IRON,MINERALS
15 LIQUID (ML) ORAL DAILY
Status: DISCONTINUED | OUTPATIENT
Start: 2021-01-01 | End: 2021-01-01 | Stop reason: HOSPADM

## 2021-01-01 RX ORDER — CEFDINIR 300 MG/1
300 CAPSULE ORAL EVERY 12 HOURS SCHEDULED
Status: DISCONTINUED | OUTPATIENT
Start: 2021-01-01 | End: 2021-01-01 | Stop reason: HOSPADM

## 2021-01-01 RX ORDER — HYDROXYZINE HYDROCHLORIDE 25 MG/1
25 TABLET, FILM COATED ORAL EVERY 6 HOURS PRN
Status: DISCONTINUED | OUTPATIENT
Start: 2021-01-01 | End: 2021-01-01 | Stop reason: HOSPADM

## 2021-01-01 RX ORDER — SODIUM HYPOCHLORITE 1.25 MG/ML
SOLUTION TOPICAL DAILY
COMMUNITY

## 2021-01-01 RX ORDER — 0.9 % SODIUM CHLORIDE 0.9 %
1000 INTRAVENOUS SOLUTION INTRAVENOUS ONCE
Status: COMPLETED | OUTPATIENT
Start: 2021-01-01 | End: 2021-01-01

## 2021-01-01 RX ADMIN — HYOSCYAMINE SULFATE: 16 SOLUTION at 10:26

## 2021-01-01 RX ADMIN — SERTRALINE 75 MG: 50 TABLET, FILM COATED ORAL at 09:28

## 2021-01-01 RX ADMIN — CEFDINIR 300 MG: 300 CAPSULE ORAL at 09:28

## 2021-01-01 RX ADMIN — CHOLECALCIFEROL TAB 125 MCG (5000 UNIT) 5000 UNITS: 125 TAB at 09:32

## 2021-01-01 RX ADMIN — DOCUSATE SODIUM 100 MG: 50 LIQUID ORAL at 10:02

## 2021-01-01 RX ADMIN — SODIUM CHLORIDE: 9 INJECTION, SOLUTION INTRAVENOUS at 01:04

## 2021-01-01 RX ADMIN — CEFTRIAXONE SODIUM 1000 MG: 1 INJECTION, POWDER, FOR SOLUTION INTRAMUSCULAR; INTRAVENOUS at 16:09

## 2021-01-01 RX ADMIN — HYOSCYAMINE SULFATE: 16 SOLUTION at 09:00

## 2021-01-01 RX ADMIN — IOPAMIDOL 75 ML: 755 INJECTION, SOLUTION INTRAVENOUS at 15:11

## 2021-01-01 RX ADMIN — CEFTRIAXONE SODIUM 1000 MG: 1 INJECTION, POWDER, FOR SOLUTION INTRAMUSCULAR; INTRAVENOUS at 14:23

## 2021-01-01 RX ADMIN — SERTRALINE 75 MG: 50 TABLET, FILM COATED ORAL at 10:27

## 2021-01-01 RX ADMIN — DOCUSATE SODIUM 100 MG: 50 LIQUID ORAL at 09:28

## 2021-01-01 RX ADMIN — CHOLECALCIFEROL TAB 125 MCG (5000 UNIT) 5000 UNITS: 125 TAB at 10:26

## 2021-01-01 RX ADMIN — WATER 1000 MG: 1 INJECTION INTRAMUSCULAR; INTRAVENOUS; SUBCUTANEOUS at 14:26

## 2021-01-01 RX ADMIN — MULTIVITAMIN 15 ML: LIQUID ORAL at 10:29

## 2021-01-01 RX ADMIN — HYOSCYAMINE SULFATE: 16 SOLUTION at 10:03

## 2021-01-01 RX ADMIN — MULTIVITAMIN 15 ML: LIQUID ORAL at 10:26

## 2021-01-01 RX ADMIN — CEFDINIR 300 MG: 300 CAPSULE ORAL at 22:13

## 2021-01-01 RX ADMIN — SERTRALINE 75 MG: 50 TABLET, FILM COATED ORAL at 09:32

## 2021-01-01 RX ADMIN — SERTRALINE 75 MG: 50 TABLET, FILM COATED ORAL at 09:29

## 2021-01-01 RX ADMIN — ENOXAPARIN SODIUM 40 MG: 40 INJECTION SUBCUTANEOUS at 09:28

## 2021-01-01 RX ADMIN — HYOSCYAMINE SULFATE: 16 SOLUTION at 09:27

## 2021-01-01 RX ADMIN — CHOLECALCIFEROL TAB 125 MCG (5000 UNIT) 5000 UNITS: 125 TAB at 10:28

## 2021-01-01 RX ADMIN — HYOSCYAMINE SULFATE: 16 SOLUTION at 09:32

## 2021-01-01 RX ADMIN — DOCUSATE SODIUM 100 MG: 50 LIQUID ORAL at 09:31

## 2021-01-01 RX ADMIN — HYOSCYAMINE SULFATE: 16 SOLUTION at 16:09

## 2021-01-01 RX ADMIN — DOCUSATE SODIUM 100 MG: 50 LIQUID ORAL at 10:25

## 2021-01-01 RX ADMIN — CHOLECALCIFEROL TAB 125 MCG (5000 UNIT) 5000 UNITS: 125 TAB at 10:02

## 2021-01-01 RX ADMIN — CEFDINIR 300 MG: 300 CAPSULE ORAL at 21:44

## 2021-01-01 RX ADMIN — SODIUM CHLORIDE: 9 INJECTION, SOLUTION INTRAVENOUS at 14:19

## 2021-01-01 RX ADMIN — MULTIVITAMIN 15 ML: LIQUID ORAL at 09:27

## 2021-01-01 RX ADMIN — SERTRALINE 75 MG: 50 TABLET, FILM COATED ORAL at 10:26

## 2021-01-01 RX ADMIN — CHOLECALCIFEROL TAB 125 MCG (5000 UNIT) 5000 UNITS: 125 TAB at 09:29

## 2021-01-01 RX ADMIN — MULTIVITAMIN 15 ML: LIQUID ORAL at 10:02

## 2021-01-01 RX ADMIN — CEFTRIAXONE SODIUM 1000 MG: 1 INJECTION, POWDER, FOR SOLUTION INTRAMUSCULAR; INTRAVENOUS at 14:30

## 2021-01-01 RX ADMIN — SODIUM CHLORIDE 1000 ML: 9 INJECTION, SOLUTION INTRAVENOUS at 14:27

## 2021-01-01 RX ADMIN — SODIUM CHLORIDE: 9 INJECTION, SOLUTION INTRAVENOUS at 10:26

## 2021-01-01 RX ADMIN — CEFTRIAXONE SODIUM 1000 MG: 1 INJECTION, POWDER, FOR SOLUTION INTRAMUSCULAR; INTRAVENOUS at 15:38

## 2021-01-01 RX ADMIN — SERTRALINE 75 MG: 50 TABLET, FILM COATED ORAL at 10:02

## 2021-01-01 RX ADMIN — DOCUSATE SODIUM 100 MG: 50 LIQUID ORAL at 10:27

## 2021-01-01 RX ADMIN — ENOXAPARIN SODIUM 40 MG: 40 INJECTION SUBCUTANEOUS at 10:27

## 2021-01-01 RX ADMIN — MULTIVITAMIN 15 ML: LIQUID ORAL at 09:31

## 2021-01-01 RX ADMIN — ENOXAPARIN SODIUM 40 MG: 40 INJECTION SUBCUTANEOUS at 09:00

## 2021-01-01 RX ADMIN — MULTIVITAMIN 15 ML: LIQUID ORAL at 09:29

## 2021-01-01 ASSESSMENT — PAIN SCALES - PAIN ASSESSMENT IN ADVANCED DEMENTIA (PAINAD)
CONSOLABILITY: 0
TOTALSCORE: 0
BREATHING: 0
NEGVOCALIZATION: 0
NEGVOCALIZATION: 0
NEGVOCALIZATION: 1
TOTALSCORE: 0
BREATHING: 0
TOTALSCORE: 0
FACIALEXPRESSION: 0
FACIALEXPRESSION: 0
BODYLANGUAGE: 0
FACIALEXPRESSION: 0
FACIALEXPRESSION: 0
NEGVOCALIZATION: 0
TOTALSCORE: 0
FACIALEXPRESSION: 1
BREATHING: 0
BODYLANGUAGE: 0
BODYLANGUAGE: 0
NEGVOCALIZATION: 1
CONSOLABILITY: 0
BODYLANGUAGE: 0
NEGVOCALIZATION: 0
BREATHING: 0
NEGVOCALIZATION: 0
BREATHING: 0
NEGVOCALIZATION: 0
BODYLANGUAGE: 0
BODYLANGUAGE: 0
BREATHING: 0
TOTALSCORE: 0
TOTALSCORE: 0
BODYLANGUAGE: 0
BREATHING: 0
CONSOLABILITY: 0
CONSOLABILITY: 0
TOTALSCORE: 0
BREATHING: 0
CONSOLABILITY: 0
FACIALEXPRESSION: 1
BODYLANGUAGE: 0
BODYLANGUAGE: 0
CONSOLABILITY: 0
CONSOLABILITY: 0
BODYLANGUAGE: 0
NEGVOCALIZATION: 0
CONSOLABILITY: 0
BREATHING: 0
FACIALEXPRESSION: 0
BREATHING: 0
TOTALSCORE: 0
BREATHING: 0
CONSOLABILITY: 0
FACIALEXPRESSION: 0
NEGVOCALIZATION: 1
FACIALEXPRESSION: 0
TOTALSCORE: 0
BREATHING: 0
NEGVOCALIZATION: 0
NEGVOCALIZATION: 1
NEGVOCALIZATION: 0
BREATHING: 0
BREATHING: 0
TOTALSCORE: 0
BODYLANGUAGE: 0
TOTALSCORE: 0
NEGVOCALIZATION: 0
CONSOLABILITY: 0
BODYLANGUAGE: 0
FACIALEXPRESSION: 0
FACIALEXPRESSION: 0
TOTALSCORE: 2
BREATHING: 0
CONSOLABILITY: 0
BREATHING: 0
BODYLANGUAGE: 0
BODYLANGUAGE: 0
BREATHING: 0
CONSOLABILITY: 0
CONSOLABILITY: 0
BODYLANGUAGE: 0
CONSOLABILITY: 0
TOTALSCORE: 2
NEGVOCALIZATION: 0
CONSOLABILITY: 0
BODYLANGUAGE: 0
FACIALEXPRESSION: 0
CONSOLABILITY: 0
NEGVOCALIZATION: 0
FACIALEXPRESSION: 0
FACIALEXPRESSION: 0
BODYLANGUAGE: 0
BODYLANGUAGE: 0
NEGVOCALIZATION: 0
TOTALSCORE: 0
BREATHING: 0
CONSOLABILITY: 0
TOTALSCORE: 0
FACIALEXPRESSION: 0
CONSOLABILITY: 0
FACIALEXPRESSION: 0
TOTALSCORE: 1
FACIALEXPRESSION: 0
TOTALSCORE: 1
NEGVOCALIZATION: 0
CONSOLABILITY: 0
BREATHING: 0
NEGVOCALIZATION: 0
BODYLANGUAGE: 0

## 2021-01-01 ASSESSMENT — PAIN SCALES - GENERAL
PAINLEVEL_OUTOF10: 0

## 2021-01-01 ASSESSMENT — ENCOUNTER SYMPTOMS
VOMITING: 0
SORE THROAT: 0
DIARRHEA: 0
COUGH: 0
SHORTNESS OF BREATH: 0
SINUS PAIN: 0
WHEEZING: 0
EYE REDNESS: 0
NAUSEA: 0
BACK PAIN: 0
EYE PAIN: 0
ABDOMINAL PAIN: 0

## 2021-10-13 NOTE — ED PROVIDER NOTES
76-year-old male with past medical history of early onset Alzheimer's dementia, BPH, diabetes, endocarditis, psychosis, urinary retention presenting from [place] with chief complaint of a fever. Patient is ANO x1 at baseline, nothing is making the fever better or worse, no other associated symptoms able to be appreciated. He does have pressure wounds on his buttock as well as his left heel. He has a Wright catheter in place and history of large kidney stones. He has been admitted before for sepsis associated with UTI. Review of Systems   Constitutional: Positive for fever. Negative for chills. HENT: Negative for ear pain, sinus pain and sore throat. Eyes: Negative for pain and redness. Respiratory: Negative for cough, shortness of breath and wheezing. Cardiovascular: Negative for chest pain. Gastrointestinal: Negative for abdominal pain, diarrhea, nausea and vomiting. Genitourinary: Negative for dysuria and flank pain. Musculoskeletal: Negative for back pain and neck pain. Skin: Negative for rash. Neurological: Positive for tremors. Negative for seizures and headaches. Hematological: Negative for adenopathy. All other systems reviewed and are negative. Physical Exam  Vitals and nursing note reviewed. Constitutional:       Appearance: Normal appearance. He is not ill-appearing. HENT:      Head: Normocephalic and atraumatic. Right Ear: External ear normal.      Left Ear: External ear normal.      Nose: Nose normal.      Mouth/Throat:      Mouth: Mucous membranes are moist.      Pharynx: Oropharynx is clear. Eyes:      Conjunctiva/sclera: Conjunctivae normal.      Pupils: Pupils are equal, round, and reactive to light. Cardiovascular:      Rate and Rhythm: Regular rhythm. Tachycardia present. Pulmonary:      Breath sounds: Normal breath sounds. Abdominal:      General: Abdomen is flat. Palpations: Abdomen is soft. Tenderness:  There is no abdominal tenderness. Musculoskeletal:         General: No deformity or signs of injury. Cervical back: Neck supple. Skin:     General: Skin is warm. Findings: Lesion present. Comments: Diaphoretic, stage III pressure ulcer lesion on buttocks as well as a necrotic wound on left foot, wound on the back of left knee. Neurological:      General: No focal deficit present. Mental Status: He is alert. Mental status is at baseline. Motor: Weakness present. Comments: Normally only ANO x1          Procedures   EKG: This EKG is signed and interpreted by me. Rate: 114  Rhythm: Sinus tachycardia  Interpretation: no acute changes, low voltage QRS with no ST elevations  Comparison: stable as compared to patient's most recent EKG      MDM  Number of Diagnoses or Management Options  Pressure injury of contiguous region involving buttock and hip, stage 3, unspecified laterality (HCC)  Sepsis, due to unspecified organism, unspecified whether acute organ dysfunction present [facility])  Urinary tract infection associated with indwelling urethral catheter, initial encounter [facility])  Diagnosis management comments: 69-year-old male past medical history of early onset Alzheimer's dementia, BPH, diabetes, endocarditis, psychosis and urinary retention presented from [place] today with chief complaint of fever and tremors. On arrival to the emergency department he was slightly hypertensive and tachycardic, he did have a temperature of 38.1 °C.  We did find a stage III pressure ulcer on his buttock, there was necrotic ulcer on the lateral aspect of his left foot as well as a wound on the back of his left knee. Lab work the emergency department showed urine with nitrites leukocytes, lactic acid of 2.2, white blood cells 1.8, no abnormalities on CMP. Blood cultures and urine cultures are pending.   Based on prior urine culture sensitivities the patient was given a gram of Rocephin in the emergency department as well as a liter fluid bolus. CT abdomen pelvis showed atrophic right kidney without signs of hydronephrosis, there are multiple nonobstructing calculi in the right kidney measuring up to 1.6 cm double with a nonobstructing 6 mm calculus in the right pelvis. Everything else was within normal limits. Chest x-ray did not show any acute cardiopulmonary findings. He will need admitted for UTI sepsis. Spoke with Dr. Irma Garber and he will admit the patient. ED Course as of Oct 13 1904   Wed Oct 13, 2021   1403 Based on prior urine culture sensitivities will start Rocephin in the emergency department. [MM]   KAREN Juárez [address] with Dr. Irma Garber and he will admit the patient. [MM]      ED Course User Index  [MM] Saida Marin DO      ED Course as of Oct 13 1904   Wed Oct 13, 2021   1403 Based on prior urine culture sensitivities will start Rocephin in the emergency department. [MM]   KAREN Juárez 80 with Dr. Irma Garber and he will admit the patient. [MM]      ED Course User Index  [MM] Saida Marin DO       --------------------------------------------- PAST HISTORY ---------------------------------------------  Past Medical History:  has a past medical history of Alzheimer's dementia ([facility] [address] BPH (benign prostatic hyperplasia), [facility] Endocarditis, Hallucinations, Muscle wasting, Prostate enlargement, and Urinary retention. Past Surgical History:  has a past surgical history that includes Dental surgery (N/A, 7/10/2020). Social History:  reports that he has never smoked. He has never used smokeless tobacco. He reports that he does not drink alcohol and does not use drugs. Family History: family history is not on file. The patients home medications have been reviewed. Allergies: Patient has no known allergies.     -------------------------------------------------- RESULTS -------------------------------------------------    LABS:  Results for orders placed or performed during the hospital encounter of 10/13/21   COVID-19, Rapid    Specimen: Nasopharyngeal Swab   Result Value Ref Range    SARS-CoV-2, NAAT Not Detected Not Detected   Lactate, Sepsis   Result Value Ref Range    Lactic Acid, Sepsis 2.2 (H) 0.5 - 1.9 mmol/L   CBC auto differential   Result Value Ref Range    WBC 11.8 (H) 4.5 - 11.5 E9/L    RBC 4.79 3.80 - 5.80 E12/L    Hemoglobin 13.6 12.5 - 16.5 g/dL    Hematocrit 46.2 37.0 - 54.0 %    MCV 96.5 80.0 - 99.9 fL    MCH 28.4 26.0 - 35.0 pg    MCHC 29.4 (L) 32.0 - 34.5 %    RDW 16.4 (H) 11.5 - 15.0 fL    Platelets 870 784 - 517 E9/L    MPV 9.8 7.0 - 12.0 fL    Neutrophils % 78.8 43.0 - 80.0 %    Immature Granulocytes % 0.6 0.0 - 5.0 %    Lymphocytes % 13.7 (L) 20.0 - 42.0 %    Monocytes % 5.6 2.0 - 12.0 %    Eosinophils % 1.0 0.0 - 6.0 %    Basophils % 0.3 0.0 - 2.0 %    Neutrophils Absolute 9.32 (H) 1.80 - 7.30 E9/L    Immature Granulocytes # 0.07 E9/L    Lymphocytes Absolute 1.62 1.50 - 4.00 E9/L    Monocytes Absolute 0.66 0.10 - 0.95 E9/L    Eosinophils Absolute 0.12 0.05 - 0.50 E9/L    Basophils Absolute 0.04 0.00 - 0.20 E9/L   Comprehensive Metabolic Panel w/ Reflex to MG   Result Value Ref Range    Sodium 138 132 - 146 mmol/L    Potassium reflex Magnesium 4.6 3.5 - 5.0 mmol/L    Chloride 102 98 - 107 mmol/L    CO2 26 22 - 29 mmol/L    Anion Gap 10 7 - 16 mmol/L    Glucose 128 (H) 74 - 99 mg/dL    BUN 15 6 - 23 mg/dL    CREATININE 1.0 0.7 - 1.2 mg/dL    GFR Non-African American >60 >=60 mL/min/1.73    GFR African American >60     Calcium 9.4 8.6 - 10.2 mg/dL    Total Protein 7.0 6.4 - 8.3 g/dL    Albumin 2.9 (L) 3.5 - 5.2 g/dL    Total Bilirubin 0.5 0.0 - 1.2 mg/dL    Alkaline Phosphatase 75 40 - 129 U/L    ALT 14 0 - 40 U/L    AST 29 0 - 39 U/L   Urinalysis   Result Value Ref Range    Color, UA Yellow Straw/Yellow    Clarity, UA CLOUDY (A) Clear    Glucose, Ur Negative Negative mg/dL    Bilirubin Urine Negative Negative    Ketones, Urine Negative Negative mg/dL Specific Gravity, UA 1.020 1.005 - 1.030    Blood, Urine TRACE (A) Negative    pH, UA 8.0 5.0 - 9.0    Protein, UA 30 (A) Negative mg/dL    Urobilinogen, Urine 0.2 <2.0 E.U./dL    Nitrite, Urine POSITIVE (A) Negative    Leukocyte Esterase, Urine LARGE (A) Negative   Lipase   Result Value Ref Range    Lipase 45 13 - 60 U/L   Microscopic Urinalysis   Result Value Ref Range    WBC, UA >20 (A) 0 - 5 /HPF    RBC, UA 0-1 0 - 2 /HPF    Bacteria, UA MANY (A) None Seen /HPF    Crystals, UA Rare (A) None Seen /HPF    Crystals, UA Rare (A) None Seen /HPF   EKG 12 lead   Result Value Ref Range    Ventricular Rate 114 BPM    Atrial Rate 114 BPM    P-R Interval 128 ms    QRS Duration 70 ms    Q-T Interval 336 ms    QTc Calculation (Bazett) 463 ms    P Axis 40 degrees    R Axis 38 degrees    T Axis 24 degrees       RADIOLOGY:  CT ABDOMEN PELVIS W IV CONTRAST Additional Contrast? None   Final Result   Atrophic appearance of right kidney. No hydronephrosis      Multiple nonobstructive calculi in the right kidney measuring up to 1.6 cm   the pole. A focal nonobstructive 6 mm calculus identified in right pelvis. XR CHEST PORTABLE   Final Result   No acute cardiopulmonary process. ------------------------- NURSING NOTES AND VITALS REVIEWED ---------------------------  Date / Time Roomed:  10/13/2021 11:49 AM  ED Bed Assignment:  09/09    The nursing notes within the ED encounter and vital signs as below have been reviewed.      Patient Vitals for the past 24 hrs:   BP Temp Temp src Pulse Resp SpO2 Height Weight   10/13/21 1807 (!) 176/95   88 22 99 %     10/13/21 1532 (!) 159/96 98.6 °F (37 °C) Axillary 102 20 95 %     10/13/21 1150 (!) 149/65 100.5 °F (38.1 °C) Infrared 122 22 95 % 6' (1.829 m) 160 lb (72.6 kg)       Oxygen Saturation Interpretation: Normal    ------------------------------------------ PROGRESS NOTES ------------------------------------------  Re-evaluation(s):  Time: 1800  Patients symptoms show no change  Repeat physical examination is not changed    --------------------------------- ADDITIONAL PROVIDER NOTES ---------------------------------  Consultations:  Time: 1900. Spoke with Dr. Karissa Quijano. Discussed case. They will admit the patient. This patient's ED course included: a personal history and physicial examination, re-evaluation prior to disposition, multiple bedside re-evaluations, IV medications, cardiac monitoring, continuous pulse oximetry and complex medical decision making and emergency management    This patient has remained hemodynamically stable during their ED course. Diagnosis:  1. Sepsis, due to unspecified organism, unspecified whether acute organ dysfunction present ([facility] [address]    2. Urinary tract infection associated with indwelling urethral catheter, initial encounter ([facility] [address]    3. Pressure injury of contiguous region involving buttock and hip, stage 3, unspecified laterality ([facility] [address]        Disposition:  Patient's disposition: Admit to telemetry  Patient's condition is stable.              Gamal Powell DO  Resident  10/13/21 8383

## 2021-10-13 NOTE — ED NOTES
Bed: 09  Expected date:   Expected time:   Means of arrival:   Comments:  terminal     Long Comer RN  10/13/21 1148

## 2021-10-13 NOTE — VCC REMOTE MONITORING
Spoke with primary RN Cherylene Leas  regarding 6 hour Sepsis bundle.     Edmund Olsen MSN, RN, [address] [place] [address] RN   Call Back # [phone]

## 2021-10-14 NOTE — DISCHARGE INSTR - COC
Continuity of Care Form    Patient Name: Ivelisse Ferguson   :  1950  MRN:  [id number]    6 [facility] date:  10/13/2021  Discharge date:  10/19/2021    Code Status Order: Prior  Advance Directives:      Admitting Physician:  Skylar Moya MD  PCP: Skylar Moya MD    Discharging Nurse: Viola Mckeon RN  [address] Unit/Room#: 0521/0521-01  Discharging Unit Phone Number: [phone]    Emergency Contact:   Extended Emergency Contact Information  Primary Emergency Contact: Katherine Fatimaland  Address: [facility], [address] [place] [address] Phone: [phone]  Relation: Legal Guardian  Secondary Emergency Contact: Santos Sheehan, [address] Phone: [phone]  Relation: Brother/Sister    Past Surgical History:  Past Surgical History:   Procedure Laterality Date    DENTAL SURGERY N/A 7/10/2020    DENTAL RADIOGRAPHS, RESTORATIONS, EXTRACTIONS X 4 performed by Elsie Paredes DDS at [address] [place] [address]       Immunization History:   Immunization History   Administered Date(s) Administered    COVID-19, Cadet Peter, PF, 30mcg/0.3mL 2021, 2021       Active Problems:  Patient Active Problem List   Diagnosis Code    PICC (peripherally inserted central catheter) removal Z45.2    Encounter for assessment of peripherally inserted central venous catheter (PICC) Z45.2    Sepsis ([facility] [address] A41.9    Dental caries K02.9    Dental abscess K04.7    Periodontal disease K05.6       Isolation/Infection:   Isolation            Contact          Patient Infection Status       Infection Onset Added Last Indicated Last Indicated By Review Planned Expiration Resolved Resolved By    MDRO (multi-drug resistant organism)  19 Minus KAYE Cheng        Mdr Providencia stuartii urine    Resolved    COVID-19 Rule Out 10/13/21 10/13/21 10/13/21 COVID-19, Rapid (Ordered)   10/13/21 Rule-Out Test Resulted    COVID-19 Rule Out 07/10/20 07/10/20 07/10/20 COVID-19 (Ordered)   07/10/20 Rule-Out Test Resulted            Nurse Assessment:  Last Vital Signs: BP (!) 148/82   Pulse 84   Temp 98.2 °F (36.8 °C) (Axillary)   Resp 18   Ht 6' (1.829 m)   Wt 160 lb (72.6 kg)   SpO2 98%   BMI 21.70 kg/m²     Last documented pain score (0-10 scale):    Last Weight:   Wt Readings from Last 1 Encounters:   10/13/21 160 lb (72.6 kg)     Mental Status:  disoriented    IV Access:  {[facility] IV ACCESS:911607496}    Nursing Mobility/ADLs:  Walking   Dependent  Transfer  Dependent  Bathing  Dependent  Dressing  Dependent  Toileting  Dependent  Feeding  Dependent  Med Admin  Dependent  Med Delivery   crushed    Wound Care Documentation and Therapy:  Wound 10/13/21 Foot Outer;Left (Active)   Wound Etiology Pressure Unstageable 10/14/21 1119   Dressing Status New dressing applied;Clean;Dry; Intact 10/13/21 2211   Wound Cleansed Cleansed with saline 10/14/21 1119   Dressing/Treatment Foam;Moist to dry 10/13/21 2211   Offloading for Diabetic Foot Ulcers Yes (type); Offloading boot; Felt or foam 10/13/21 2211   Wound Length (cm) 2 cm 10/14/21 1119   Wound Width (cm) 2 cm 10/14/21 1119   Wound Surface Area (cm^2) 4 cm^2 10/14/21 1119   Drainage Amount Small 10/14/21 1119   Drainage Description Serosanguinous 10/14/21 1119   Odor Mild 10/14/21 1119   Margins Unattached edges 10/13/21 2211   Number of days: 0       Wound 10/13/21 Knee Posterior; Left (Active)   Dressing Status New dressing applied;Clean;Dry; Intact 10/13/21 2213   Wound Cleansed Cleansed with saline;Irrigated with saline 10/13/21 2213   Dressing/Treatment Foam;Moist to dry 10/13/21 2213   Drainage Amount Small 10/13/21 2213   Drainage Description Thin;Serosanguinous 10/13/21 2213   Odor Mild 10/13/21 2213   Sharri-wound Assessment Intact 10/13/21 2213   Margins Undefined edges 10/13/21 2213   Number of days: 0       Wound 10/13/21 Buttocks between cheeks and all around sacrum (Active)   Dressing Status New dressing applied 10/14/21 1119   Wound Cleansed Soap and water 10/13/21 2215   Dressing/Treatment Foam 10/14/21 1119   Drainage Amount Moderate 10/14/21 1119   Drainage Description Serosanguinous 10/14/21 1119   Odor None 10/14/21 1119   Sharri-wound Assessment Other (Comment) 10/14/21 1119   Number of days: 0        Elimination:  Continence:   · Bowel: {YES / XX:99129}  · Bladder: {YES / KL:47871}  Urinary Catheter: {Urinary Catheter:651328716}   Colostomy/Ileostomy/Ileal Conduit: {YES / CV:18649}       Date of Last BM: ***    Intake/Output Summary (Last 24 hours) at 10/14/2021 1425  Last data filed at 10/14/2021 0631  Gross per 24 hour   Intake    Output 550 ml   Net -550 ml     I/O last 3 completed shifts:  In: -   Out: 550 [Urine:550]    Safety Concerns:     Aspiration Risk    Impairments/Disabilities:      Speech    Nutrition Therapy:  Current Nutrition Therapy:   - Oral Diet:  [address] FADI Oral YGVS:625354375}    Routes of Feeding: Oral  Liquids: {Slp liquid thickness:32471}  Daily Fluid Restriction: no  Last Modified Barium Swallow with Video (Video Swallowing Test): not done    Treatments at the Time of Hospital Discharge:   Respiratory Treatments: ***  Oxygen Therapy:  {Therapy; copd oxygen:49351}  Ventilator:    - No ventilator support    Rehab Therapies: {THERAPEUTIC INTERVENTION:2009028547}  Weight Bearing Status/Restrictions: No weight bearing restirctions  Other Medical Equipment (for information only, NOT a DME order):  {EQUIPMENT:733252226}  Other Treatments: ***    Patient's personal belongings (please select all that are sent with patient):  None      RN SIGNATURE:  Electronically signed by Selena Weinstein RN on 10/19/21 at 10:31 PM EDT    CASE MANAGEMENT/SOCIAL WORK SECTION    Inpatient Status Date: 10/13/21    Readmission Risk Assessment Score:  Readmission Risk              Risk of Unplanned Readmission:  8           Discharging to Facility/ Agency   · [facility]  [facility] [address]  · Phone: [phone]  · Fax: [phone]    Dialysis Facility (if applicable)   · Name:  · Address:  · Dialysis Schedule:  · Phone:  · Fax:    / signature: Electronically signed by Tania Hill RN on 10/14/2021 at 2:27 PM      PHYSICIAN SECTION    Prognosis: Fair    Condition at Discharge: Stable    Rehab Potential (if transferring to Rehab): Fair    Recommended Labs or Other Treatments After Discharge: ***    Physician Certification: I certify the above information and transfer of Coreen Vitale  is necessary for the continuing treatment of the diagnosis listed and that he requires [facility] for greater 30 days.      Update Admission H&P: No change in H&P    PHYSICIAN SIGNATURE:  Electronically signed by Justina Wisdom MD on 10/19/21 at 6:15 PM EDT

## 2021-10-14 NOTE — CARE COORDINATION
10/14/21 1350 CM note: COVID (-) 10/13/21.  consult noted for dc planning. Patient has legal guardian, Jose Stallworth [phone] ext . Per Gilberto, discharge plan is return to [place] when patient is medically stable. Gilberto states patient is currently a full code but questions the appropriateness of this given pts Alzheimers dementia and asks if this is something the hospital doctor would address or if he should f/u with this at the nursing facility. Updated pts RN, Jose Coyle on this. Per Cooper Hartmann [place] liaison, pt is a [employer], will accept back; NO PRECERT or covid testing needed. WILL NEED SIGNED FADI. CM to watch for ATB at discharge.  Electronically signed by Gisel Yadav RN on 10/14/2021 at 2:23 PM

## 2021-10-14 NOTE — FLOWSHEET NOTE
Inpatient Wound Care    Admit Date: 10/13/2021 11:49 AM    Reason for consult:  Skin care  Significant history:    Past Medical History:   Diagnosis Date    Alzheimer's dementia ([facility] [address]     BPH (benign prostatic hyperplasia)     Diabetes mellitus ([facility]     Endocarditis     Hallucinations     psychotic disorder    Muscle wasting     Prostate enlargement     Urinary retention        Wound history:      Findings:       10/14/21 1119   Wound 10/13/21 Foot Outer;Left   Date First Assessed/Time First Assessed: 10/13/21 2211   Present on Hospital Admission: Yes  Location: Foot  Wound Location Orientation: Outer;Left   Wound Etiology Pressure Unstageable   Wound Cleansed Cleansed with saline   Wound Length (cm) 2 cm   Wound Width (cm) 2 cm   Wound Surface Area (cm^2) 4 cm^2   Drainage Amount Small   Drainage Description Serosanguinous   Odor Mild   Wound 10/13/21 Buttocks between cheeks and all around sacrum   Date First Assessed/Time First Assessed: 10/13/21 2214   Present on Hospital Admission: Yes  Location: Buttocks  Wound Description (Comments): between cheeks and all around sacrum   Dressing Status New dressing applied   Dressing/Treatment Foam   Drainage Amount Moderate   Drainage Description Serosanguinous   Odor None   Sharri-wound Assessment Other (Comment)  (redness)       Impression:  Buttocks excoriatrion redness  Left ankle unstageable pressure injury      Interventions in place:  Using mepilex    Plan:  Recommend santyl to ankle  Off load heels      Susie Barry RN 10/14/2021 11:42 AM

## 2021-10-15 NOTE — PLAN OF CARE
Problem: Falls - Risk of:  Goal: Will remain free from falls  Description: Will remain free from falls  Outcome: Met This Shift  Goal: Absence of physical injury  Description: Absence of physical injury  Outcome: Met This Shift     Problem: Skin Integrity:  Goal: Will show no infection signs and symptoms  Description: Will show no infection signs and symptoms  Outcome: Met This Shift  Goal: Absence of new skin breakdown  Description: Absence of new skin breakdown  Outcome: Met This Shift     Problem: Inadequate oral food/beverage intake (NI-2.1)  Goal: Food and/or Nutrient Delivery  Description: Individualized approach for food/nutrient provision.   10/15/2021 1307 by Cy Jo RD, LD  Outcome: Met This Shift

## 2021-10-15 NOTE — PLAN OF CARE
Problem: Falls - Risk of:  Goal: Will remain free from falls  Description: Will remain free from falls  10/15/2021 0155 by Tram Mcnally RN  Outcome: Met This Shift  10/14/2021 1835 by Marie Young RN  Outcome: Ongoing  Goal: Absence of physical injury  Description: Absence of physical injury  10/15/2021 0155 by Tram Mcnally RN  Outcome: Met This Shift  10/14/2021 1835 by Marie Young RN  Outcome: Ongoing     Problem: Skin Integrity:  Goal: Will show no infection signs and symptoms  Description: Will show no infection signs and symptoms  10/15/2021 0155 by Tram Mcnally RN  Outcome: Met This Shift  10/14/2021 1835 by Marie Young RN  Outcome: Ongoing  Goal: Absence of new skin breakdown  Description: Absence of new skin breakdown  10/15/2021 0155 by Tram Mcnally RN  Outcome: Met This Shift  10/14/2021 1835 by Marie Young RN  Outcome: Ongoing

## 2021-10-15 NOTE — H&P
[address]                              HISTORY AND PHYSICAL    PATIENT NAME: Carlos Brice                    :        1950  MED REC NO:   [id number]                            ROOM:       5824  ACCOUNT NO:   [de-identified]                           ADMIT DATE: 10/13/2021  PROVIDER:     Deric Field MD    CHIEF COMPLAINT:  Fever. HISTORY OF PRESENT ILLNESS:  The patient is a 69-year-old gentleman who  resides in [facility]. He does have a history of  early onset Alzheimer's dementia, BPH, diabetes, endocarditis, and  urinary retention with indwelling Wright catheter who presented from the  nursing home with fever. There is no other associated symptoms  appreciated at this point. The patient does have pressure wounds on his  buttock as well as his left heel. He does have a Wright catheter. He  has a history of recurrent UTIs and sepsis in the past.  The patient was  evaluated in the ER and his white cell count was 11.8. His urinalysis  revealed more than 20 WBCs with many bacteria with large leukocyte  esterase. He had a CT scan of the abdomen and pelvis done, which showed  no hydronephrosis. He had multiple nonobstructive calculi in the right  kidney. Focal nonobstructive 6 mm calculus identified in the right  pelvis. The patient was given Rocephin intravenously, IV fluids, and  was admitted for further evaluation and management. PAST MEDICAL HISTORY:  1.  Diabetes mellitus type 2.  2.  History of endocarditis in the past.  3.  BPH with urinary retention and chronic indwelling Wright catheter. 4.  Chronic pain syndrome. 5.  Alzheimer's dementia. 6.  Iron-deficiency anemia. 7.  Generalized anxiety. 8.  History of psoas muscle abscess years ago. PAST SURGICAL HISTORY:  Unobtainable. SOCIAL HISTORY:  The patient is a nonsmoker.   No history of alcohol or  drug abuse.    FAMILY HISTORY:  Noncontributory. ALLERGIES:  No known drug allergies. MEDICATIONS:  Tylenol 500 every six hours as needed, Colace 100 mg  daily, hydroxyzine 25 mg every six hours as needed, multivitamin daily,  Zoloft 75 mg daily, vitamin D3 5000 units daily. REVIEW OF SYSTEMS:  Unobtainable secondary to his mental status. PHYSICAL EXAMINATION:  GENERAL APPEARANCE:  A 69-year-old gentleman who is for the most part  nonverbal.  VITAL SIGNS:  Temperature 98.1, pulse 87 per minute, respiratory rate 20  per minute, blood pressure 120/88, saturation of oxygen 96% on room air. HEENT:  Normocephalic, atraumatic. His pupils are reactive. NECK:  Supple. No lymphadenopathy. No thyromegaly. CHEST:  Essentially clear to auscultation bilaterally. HEART:  Regular rate and rhythm. Normal S1 and S2.  ABDOMEN:  Soft, nontender. Bowel sounds are present. Not distended. EXTREMITIES:  There is no edema. He does have heel protectors in both  lower extremities. NEUROLOGICAL:  He is alert and awake, but confused. For the most part  nonverbal.  He does have a legal guardian. He keeps his fist and arm  contracted. LABORATORY DATA:  Sodium 138, potassium 4.6, chloride 102, bicarb 26,  BUN 15, creatinine of 1.0. Lactic acid 2.2. Blood glucose 128. White  cell count 11.8, hemoglobin 13.6, hematocrit 46.2, and platelet count  [phone]. ASSESSMENT AND PLAN:  1.  UTI. The patient's urine is suggestive of urinary tract infection. Blood culture and urine cultures have been sent. We will change his  Wright catheter. We will keep him on Rocephin 1 gm IV daily. CT scan of  the abdomen and pelvis with no signs of obstructive uropathy at this  point. We will observe. 2.  Sepsis secondary to above. We will place him on normal saline at 50  mL an hour and continue with IV antibiotics. We will recheck lactic  acid. 3.  BPH with chronic urinary retention and indwelling Wright catheter.   4.  History of early onset Alzheimer's dementia. 5.  History of depression and psychosis. 6.  History of chronic pain syndrome. 7.  History of endocarditis in the past.  8.  _____ pressure ulcer lesion on the buttock as well as necrotic wound  on the left foot and wound on the back of the left knee. We will  consult wound care nurse.         Kwame Guzman MD    D: 10/14/2021 19:01:24       T: 10/14/2021 21:07:27     [email or web address]  Job#: [id number]     Doc#: [id number]    CC: no

## 2021-10-15 NOTE — PROGRESS NOTES
Comprehensive Nutrition Assessment    Type and Reason for Visit:  Initial, Positive Nutrition Screen, Wound    Nutrition Recommendations/Plan: Continue with diet and start ONS BID Gelatein 20    Nutrition Assessment:  Pt at nutr'l compromise AEB poor p.o. intakes PTA, at further risk 2/2 increased nutrient needs d/t wounds. Will start ONS BID and monitor    Malnutrition Assessment:  Malnutrition Status: At risk for malnutrition (Comment)    Context:  Chronic Illness     Findings of the 6 clinical characteristics of malnutrition:  Energy Intake:  Mild decrease in energy intake (Comment)  Weight Loss:  Unable to assess (no wt hx)     Body Fat Loss:  No significant body fat loss     Muscle Mass Loss:  No significant muscle mass loss    Fluid Accumulation:  No significant fluid accumulation     Strength:  Not Performed    Estimated Daily Nutrient Needs:  Energy (kcal):  9136-9847; Weight Used for Energy Requirements:  Current     Protein (g):   (x1.3-1.5gm/kg); Weight Used for Protein Requirements:  Current        Fluid (ml/day):  8528-9602; Method Used for Fluid Requirements:  1 ml/kcal      Nutrition Related Findings:  Alert,abd WDL, no edema, +BS, -I/O -1.3L      Wounds:  Multiple, Pressure Injury, Wound Consult Pending, Unstageable       Current Nutrition Therapies:    ADULT DIET; Dysphagia - Pureed; Mildly Thick (Nectar)  Adult Oral Nutrition Supplement;  Other Oral Supplement; GELATEIN 20    Anthropometric Measures:  · Height: 6' (182.9 cm)  · Current Body Weight: 160 lb (72.6 kg) (10/13)   · Ideal Body Weight: 178 lbs; % Ideal Body Weight 89.9 %   · BMI: 21.7  · Adjusted Body Weight:  ; No Adjustment   · BMI Categories: Underweight (BMI less than 22) age over 72       Nutrition Diagnosis:   · Increased nutrient needs related to increase demand for energy/nutrients as evidenced by poor intake prior to admission, intake 0-25%, wounds      Nutrition Interventions:   Food and/or Nutrient Delivery: Continue Current Diet, Start Oral Nutrition Supplement  Nutrition Education/Counseling:  Education not indicated   Coordination of Nutrition Care:  Continue to monitor while inpatient    Goals:  Consume >50-75% meals/ONS       Nutrition Monitoring and Evaluation:   Behavioral-Environmental Outcomes:  None Identified   Food/Nutrient Intake Outcomes:  Food and Nutrient Intake, Supplement Intake  Physical Signs/Symptoms Outcomes:  Biochemical Data, Chewing or Swallowing, Fluid Status or Edema, Hemodynamic Status, Nutrition Focused Physical Findings, Skin, Weight     Discharge Planning:     Too soon to determine     Electronically signed by Radha Gale RD, LD on 10/15/21 at 1:13 PM EDT    Contact: [phone]

## 2021-10-15 NOTE — PROGRESS NOTES
Subjective:  Awake   No distress. No issues overnight. Non verbal    Objective:    /80   Pulse 99   Temp 98.1 °F (36.7 °C) (Axillary)   Resp 18   Ht 6' (1.829 m)   Wt 160 lb (72.6 kg)   SpO2 96%   BMI 21.70 kg/m²       Intake/Output Summary (Last 24 hours) at 10/15/2021 1438  Last data filed at 10/15/2021 1355  Gross per 24 hour   Intake 175 ml   Output 1625 ml   Net -1450 ml     Generally: awake,non verbal.  Neck: supple. Heart:  RRR, no murmurs, gallops, or rubs. Lungs: Decreased breath sounds b/l. Abd: bowel sounds present, nontender, nondistended, no masses no localized tenderness  Extrem:  No clubbing, cyanosis, or edema  Neuro: Alert,confused,  but does not communicate,     Last 3 BMP  Recent Labs     10/13/21  1254 10/14/21  1606    140   K 4.6 3.5    107   CO2 26 25   BUN 15 11   CREATININE 1.0 0.8   GLUCOSE 128* 121*   CALCIUM 9.4 8.6       Last 3 CMP:    Recent Labs     10/13/21  1254 10/14/21  1606    140   K 4.6 3.5    107   CO2 26 25   BUN 15 11   CREATININE 1.0 0.8   GLUCOSE 128* 121*   CALCIUM 9.4 8.6   PROT 7.0 6.1*   LABALBU 2.9* 2.7*   BILITOT 0.5 0.3   ALKPHOS 75 72   AST 29 18   ALT 14 11       CBC:  Recent Labs     10/14/21  1606   WBC 8.0   RBC 4.09   HGB 11.7*   HCT 37.1   MCV 90.7   MCH 28.6   MCHC 31.5*   RDW 16.2*      MPV 9.6       Recent Labs     10/13/21  1254 10/14/21  1606   PROT 7.0 6.1*       No results for input(s): MG in the last 72 hours. Scheduled Meds:   influenza virus vaccine  0.5 mL IntraMUSCular Prior to discharge    vitamin D3  1 each Oral Daily    docusate  100 mg Oral Daily    multivitamin with iron-minerals  15 mL Oral Daily    sertraline  75 mg Oral Daily    Sodium Hypochlorite   Irrigation Daily    cefTRIAXone (ROCEPHIN) IV  1,000 mg IntraVENous Q24H     Continuous Infusions:   sodium chloride 50 mL/hr at 10/14/21 0104     PRN Meds:.acetaminophen, hydrOXYzine    Assessment: 1. Sepsis , hemodynamically stable. 2.?  UTI due to hickey catheter  3. Chronic indwelling Hickey catheter  4. Alzheimer's dementia  5. Nephrolithiasis. 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 7.Pressure ulcers buttocks, lt foot and behind lt knee     Plan:   Continue iv Rocephin  Iv fluids  Consult wound care nurse for evaluation   Blood cultures negative so far  Urine cultures with mixed bacterial growth.     Steve Villa MD  2:38 PM  10/15/2021

## 2021-10-16 NOTE — PLAN OF CARE
Problem: Falls - Risk of:  Goal: Will remain free from falls  Description: Will remain free from falls  10/15/2021 2320 by Viola Mckeon RN  Outcome: Met This Shift  10/15/2021 1918 by Brandi Du RN  Outcome: Met This Shift  Goal: Absence of physical injury  Description: Absence of physical injury  10/15/2021 2320 by Viola Mckeon RN  Outcome: Met This Shift  10/15/2021 1918 by Brandi Du RN  Outcome: Met This Shift     Problem: Skin Integrity:  Goal: Will show no infection signs and symptoms  Description: Will show no infection signs and symptoms  10/15/2021 2320 by Viola Mckeon RN  Outcome: Met This Shift  10/15/2021 1918 by Brandi Du RN  Outcome: Met This Shift  Goal: Absence of new skin breakdown  Description: Absence of new skin breakdown  10/15/2021 2320 by Viola Mcekon RN  Outcome: Met This Shift  10/15/2021 1918 by Brandi Du RN  Outcome: Met This Shift     Problem: Inadequate oral food/beverage intake (NI-2.1)  Goal: Food and/or Nutrient Delivery  Description: Individualized approach for food/nutrient provision.   10/15/2021 1307 by Supriya Rueda RD, LD  Outcome: Met This Shift

## 2021-10-16 NOTE — CONSULTS
[facility]  Consult Note    [address] [facility] [address]  [place], [address]  Phone [phone]   [phone]      Admit Date: 10/13/2021 11:49 AM  Pt Name: Cal Cisneros  MRN: [id number]  : 1950  Reason for Consult:    Chief Complaint   Patient presents with    Fever     diaphoretic, shaking onset this am-hx obstructing kidney stones     Requesting Physician:  Laura Martinez MD  PCP: Laura Martinez MD  History Obtained From:  patient, chart   ID consulted for Sepsis ([facility] [A41.9]  Urinary tract infection associated with indwelling urethral catheter, initial encounter ([facility] [address] [T83.511A, N39.0]  Pressure injury of contiguous region involving buttock and hip, stage 3, unspecified laterality ([address] [L89.43]  Sepsis, due to unspecified organism, unspecified whether acute organ dysfunction present ([address] [A41.9]  on hospital day [facility]       Chief Complaint   Patient presents with    Fever     diaphoretic, shaking onset this am-hx obstructing kidney stones     HISTORYOF PRESENT ILLNESS   Cal Cisneros is a 66 y.o. male who presents with   has a past medical history of Alzheimer's dementia ([address] BPH (benign prostatic hyperplasia), Diabetes mellitus ([address] Endocarditis, Hallucinations, Muscle wasting, Prostate enlargement, and Urinary retention. ED TRIAGEVITALS  BP: (!) 152/68, Temp: 98.3 °F (36.8 °C), Pulse: 98, Resp: 18, SpO2: 99 %  HPI  Pt is poor historian unable to get hpi/ros  EMR reviewed  Afebrile wbc10.1 cr0.8   Blood cx ngtd  covid screen neg   Urine mixed patsy   Ct a/p Atrophic appearance of right kidney.  No hydronephrosis     Multiple nonobstructive calculi in the right kidney measuring up to 1.6 cm   the pole.  A focal nonobstructive 6 mm calculus identified in right pelvis.    cxry nap   Pt in bed     REVIEW OF SYSTEMS     CONSTITUTIONAL:   As above    Medications Prior to Admission: sodium hypochlorite (DAKINS) 0.125 % SOLN external solution, Apply topically daily Lt knee  acetaminophen (APAP EXTRA STRENGTH) 500 MG tablet, Take 1 tablet by mouth every 6 hours as needed for Pain  [DISCONTINUED] ibuprofen (ADVIL;MOTRIN) 600 MG tablet, Take 1 tablet by mouth every 6 hours as needed for Pain  docusate (COLACE) 50 MG/5ML liquid, Take 100 mg by mouth daily  Multiple Vitamins-Minerals (CEROVITE ADVANCED FORMULA) LIQD, Take 9 mg by mouth daily  Cholecalciferol (VITAMIN D3) 5000 units TABS, Take 1 each by mouth daily  sertraline (ZOLOFT) 25 MG tablet, Take 75 mg by mouth daily  hydrOXYzine (ATARAX) 25 MG tablet, Take 25 mg by mouth every 6 hours as needed for Itching  CURRENT MEDICATIONS     Current Facility-Administered Medications:     influenza A&B vaccine (FLUAD QUADRIVALENT) injection 0.5 mL, 0.5 mL, IntraMUSCular, Prior to discharge, Veronica Canas RN    acetaminophen (TYLENOL) tablet 500 mg, 500 mg, Oral, Q6H PRN, Lindsay Maharaj MD    vitamin D3 (CHOLECALCIFEROL) tablet 5,000 Units, 1 each, Oral, Daily, Lindsay Maharaj MD, 5,000 Units at 10/16/21 1026    docusate (COLACE) 50 MG/5ML liquid 100 mg, 100 mg, Oral, Daily, Lindsay Maharaj MD, 100 mg at 10/16/21 1025    hydrOXYzine (ATARAX) tablet 25 mg, 25 mg, Oral, Q6H PRN, Lindsay Maharaj MD    multivitamin with iron-minerals liquid 15 mL, 15 mL, Oral, Daily, Bahaa A Awadalla, MD, 15 mL at 10/16/21 1026    sertraline (ZOLOFT) tablet 75 mg, 75 mg, Oral, Daily, Lindsay Maharaj MD, 75 mg at 10/16/21 1026    Sodium Hypochlorite (DAKINS) 0.25 % external solution, , Irrigation, Daily, Lindsay Maharaj MD, Given at 10/16/21 1026    cefTRIAXone (ROCEPHIN) 1,000 mg in sodium chloride (PF) 10 mL IV syringe, 1,000 mg, IntraVENous, Q24H, Bahaa A Awadalla, MD, 1,000 mg at 10/15/21 1423    0.9 % sodium chloride infusion, , IntraVENous, Continuous, Lindsay Maharaj MD, Last Rate: 50 mL/hr at 10/14/21 0104, New Bag at 10/14/21 0104  ALLERGIES     Patient has no known allergies. Immunization History   Administered Date(s) Administered    COVID-19, Pfizer, PF, 30mcg/0.3mL 01/13/2021, 02/03/2021      Internal Administration   First Dose COVID-19, Pfizer, PF, 30mcg/0.3mL  01/13/2021   Second Dose COVID-19, Pfizer, PF, 30mcg/0.3mL   02/03/2021       Last COVID Lab SARS-CoV-2, NAAT (no units)   Date Value   10/13/2021 Not Detected            PAST MEDICAL HISTORY     Past Medical History:   Diagnosis Date    Alzheimer's dementia ([facility]     BPH (benign prostatic hyperplasia)     Diabetes mellitus ([facility]     Endocarditis     Hallucinations     psychotic disorder    Muscle wasting     Prostate enlargement     Urinary retention      SURGICAL HISTORY       Past Surgical History:   Procedure Laterality Date    DENTAL SURGERY N/A 7/10/2020    DENTAL RADIOGRAPHS, RESTORATIONS, EXTRACTIONS X 4 performed by Shaista Kaminski DDS at [address] [place] [address]     History reviewed. No pertinent family history. SOCIAL HISTORY       Social History     Socioeconomic History    Marital status:      Spouse name: None    Number of children: None    Years of education: None    Highest education level: None   Occupational History    None   Tobacco Use    Smoking status: Never Smoker    Smokeless tobacco: Never Used   Substance and Sexual Activity    Alcohol use: No    Drug use: No    Sexual activity: None   Other Topics Concern    None   Social History Narrative    None     Social Determinants of Health     Financial Resource Strain:     Difficulty of Paying Living Expenses:    Food Insecurity:     Worried About Running Out of Food in the Last Year:     Ran Out of Food in the Last Year:    Transportation Needs:     Lack of Transportation (Medical):      Lack of Transportation (Non-Medical):    Physical Activity:     Days of Exercise per Week:     Minutes of Exercise per Session:    Stress:     Feeling of Stress :    Social Connections:     Frequency of Communication with Friends and Family:     Frequency of Social Gatherings with Friends and Family:     Attends Denominational Services:     Active Member of Clubs or Organizations:     Attends Club or Organization Meetings:     Marital Status:    Intimate Partner Violence:     Fear of Current or Ex-Partner:     Emotionally Abused:     Physically Abused:     Sexually Abused:      ·      PHYSICAL EXAM       ED Triage Vitals [10/13/21 1150]   BP Temp Temp Source Pulse Resp SpO2 Height Weight   (!) 149/65 100.5 °F (38.1 °C) Infrared 122 22 95 % 6' (1.829 m) 160 lb (72.6 kg)     Vitals:    Vitals:    10/15/21 1700 10/15/21 2005 10/16/21 0233 10/16/21 0849   BP: 108/80 (!) 151/67 (!) 141/83 (!) 152/68   Pulse: 96 102 99 98   Resp: 18 18 16 18   Temp: 98.8 °F (37.1 °C) 98.8 °F (37.1 °C) 98.6 °F (37 °C) 98.3 °F (36.8 °C)   TempSrc: Axillary Axillary Temporal Axillary   SpO2: 96% 98% 99%    Weight:       Height:         Physical Exam   Constitutional/General: Arousable  NAD  Head: NC/AT  Pulmonary: Lungs dec  to auscultation bilaterally. Not in respiratory distress  Cardiovascular:  Regular rate and rhythm, no murmurs, gallops, or rubs. Abdomen: Soft, + BS. No distension. Nontender. Extremities:  Traced edmea. Warm and well perfused  Pulses:  Distal pulses dec   Skin: Warm and dry without rash  Neurologic:     contracted  Psych:  Gets agitated     DIAGNOSTIC RESULTS   RADIOLOGY:   CT ABDOMEN PELVIS W IV CONTRAST Additional Contrast? None    Result Date: 10/13/2021  EXAMINATION: CT OF THE ABDOMEN AND PELVIS WITH CONTRAST 10/13/2021 2:55 pm TECHNIQUE: CT of the abdomen and pelvis was performed with the administration of intravenous contrast. Multiplanar reformatted images are provided for review. Dose modulation, iterative reconstruction, and/or weight based adjustment of the mA/kV was utilized to reduce the radiation dose to as low as reasonably achievable. COMPARISON: CT of the abdomen pelvis August 19, 2019.  HISTORY: ORDERING SYSTEM PROVIDED HISTORY: r/o obstructive stone TECHNOLOGIST PROVIDED HISTORY: Additional Contrast?->None Reason for exam:->r/o obstructive stone Decision Support Exception - unselect if not a suspected or confirmed emergency medical condition->Emergency Medical Condition (MA) FINDINGS: Lung Bases: Bilateral compressive atelectasis. Liver: No hepatic lesions identified. Bile ducts:  Gallbladder is unremarkable. No evidence of intrahepatic or extrahepatic biliary dilatation. Pancreas: No pancreatic lesions. The pancreatic duct is not dilated. Adrenal glands: Normal in appearance. Kidneys: No evidence of hydronephrosis. The right kidney is atrophic appearance compared to the left kidney. There are multiple nonobstructive renal calculi identified in the kidney measuring to 1.6 cm in upper pole. There is a focal nonobstructive calculus identified renal pelvis measuring 6 mm. Spleen: No enhancing lesions. Bowel: No evidence of bowel obstruction. Large amount stool identified rectum. Appendix is normal appearance. Peritoneum/Retroperitoneum: No abnormal pelvic lymphadenopathy. Pelvis:  Bladder decompressed with Wright catheter. Bones/Soft tissues:   No aggressive osseous lesions. Atrophic appearance of right kidney. No hydronephrosis Multiple nonobstructive calculi in the right kidney measuring up to 1.6 cm the pole. A focal nonobstructive 6 mm calculus identified in right pelvis. XR CHEST PORTABLE    Result Date: 10/13/2021  EXAMINATION: ONE XRAY VIEW OF THE CHEST 10/13/2021 1:48 pm COMPARISON: August 18, 2019 HISTORY: ORDERING SYSTEM PROVIDED HISTORY: other TECHNOLOGIST PROVIDED HISTORY: Reason for exam:->other FINDINGS: Heart is normal size. Lungs are clear. No infiltrates, consolidates or pleural effusions observed. There is no perihilar vascular congestion. No acute cardiopulmonary process.      LABS  Recent Labs     10/14/21  1606 10/15/21  1408 10/16/21  0950   WBC 8.0 8.4 10.3   HGB 11.7* 13.8 13.7   HCT 37.1 49.6 44.7   MCV 90.7 102.7* 91.0    241 323     Recent Labs     10/14/21  1606 10/14/21  1606 10/15/21  1408 10/15/21  1408 10/16/21  0950     --  144  --  142   K 3.5   < > 4.3   < > 3.6      < > 109*   < > 107   CO2 25   < > 23   < > 24   BUN 11  --  9  --  12   CREATININE 0.8  --  0.8  --  0.8   GFRAA >60  --  >60  --  >60   LABGLOM >60  --  >60  --  >60   GLUCOSE 121*  --  105*  --  138*   PROT 6.1*  --  6.1*  --  6.9   LABALBU 2.7*  --  2.8*  --  2.9*   CALCIUM 8.6  --  9.1  --  9.1   BILITOT 0.3  --  0.4  --  0.6   ALKPHOS 72  --  67  --  78   AST 18  --  31  --  19   ALT 11  --  11  --  11    < > = values in this interval not displayed. Lab Results   Component Value Date    COVID19 Not Detected 10/13/2021     COVID-19/LISSET-COV2 LABS  Recent Labs     10/14/21  1606 10/15/21  1408 10/16/21  0950   AST 18 31 19   ALT 11 11 11        MICROBIOLOGY:     Cultures :   Lab Results   Component Value Date    BC 24 Hours no growth 10/13/2021    ORG Proteus mirabilis 08/18/2019    ORG Providencia stuartii 08/18/2019     Lab Results   Component Value Date    BLOODCULT2 24 Hours no growth 10/13/2021    BLOODCULT2 5 Days- no growth 08/18/2019    ORG Proteus mirabilis 08/18/2019    ORG Providencia stuartii 08/18/2019          Urine Culture, Routine   Date Value Ref Range Status   10/13/2021   Final    10 to 100,000 CFU/mL  Mixed patsy isolated. Further workup and sensitivity testing  is not routinely indicated and will not be performed. Mixed patsy isolated includes:  Mixed gram negative rods  Proteus species  Gram positive organism     08/18/2019 >100,000 CFU/ml  Final   08/18/2019   Final    >100,000 CFU/ml  This isolate demonstrated resistance to multiple classes of  antibiotics. Please review results with care and follow  isolation guidelines as indicated.             FINAL IMPRESSION    Patient is a 66 y.o. male who presented with   Chief Complaint   Patient presents with   Adalgisa Blank Fever     diaphoretic, shaking onset this am-hx obstructing kidney stones    and admitted for Sepsis [facility]) [A41.9]  Urinary tract infection associated with indwelling urethral catheter, initial encounter ([facility] [address] [T83.511A, N39.0]  Pressure injury of contiguous region involving buttock and hip, stage 3, unspecified laterality ([facility] [address] [L89.43]  Sepsis, due to unspecified organism, unspecified whether acute organ dysfunction present ([facility] [A41.9]      cx mixed from urine follow blood cx cont atbx  Last urine cx 8/2019 ESBL  Wound care  nutrition        cefTRIAXone (ROCEPHIN) 1,000 mg in sodium chloride (PF) 10 mL IV syringe, Q24H   if  Not better will in the next 24 will switch        Imaging and labs were reviewed per medical records and any ID pertinent labs were addressed with the patient. Thank you for involving me in the care of Yvan Summers. Please do not hesitate to call for any questions or concerns.          Electronically signed by Ghulam Kaur MD on 10/16/2021 at 12:49 PM

## 2021-10-16 NOTE — PROGRESS NOTES
Subjective:  Asleep  No distress. No issues overnight. Objective:    BP (!) 152/68   Pulse 98   Temp 98.3 °F (36.8 °C) (Axillary)   Resp 18   Ht 6' (1.829 m)   Wt 160 lb (72.6 kg)   SpO2 99%   BMI 21.70 kg/m²       Intake/Output Summary (Last 24 hours) at 10/16/2021 1255  Last data filed at 10/16/2021 0618  Gross per 24 hour   Intake 75 ml   Output 2200 ml   Net -2125 ml     Generally: no distress   Neck: supple. Heart:  RRR, no murmurs, gallops, or rubs. Lungs: Decreased breath sounds b/l. Abd: bowel sounds present,  nondistended, no masses no localized tenderness  Extrem:  No clubbing, cyanosis, or edema  Neuro: Asleep, eyes closed. Last 3 BMP  Recent Labs     10/14/21  1606 10/15/21  1408 10/16/21  0950    144 142   K 3.5 4.3 3.6    109* 107   CO2 25 23 24   BUN 11 9 12   CREATININE 0.8 0.8 0.8   GLUCOSE 121* 105* 138*   CALCIUM 8.6 9.1 9.1       Last 3 CMP:    Recent Labs     10/14/21  1606 10/15/21  1408 10/16/21  0950    144 142   K 3.5 4.3 3.6    109* 107   CO2 25 23 24   BUN 11 9 12   CREATININE 0.8 0.8 0.8   GLUCOSE 121* 105* 138*   CALCIUM 8.6 9.1 9.1   PROT 6.1* 6.1* 6.9   LABALBU 2.7* 2.8* 2.9*   BILITOT 0.3 0.4 0.6   ALKPHOS 72 67 78   AST 18 31 19   ALT 11 11 11       CBC:  Recent Labs     10/16/21  0950   WBC 10.3   RBC 4.91   HGB 13.7   HCT 44.7   MCV 91.0   MCH 27.9   MCHC 30.6*   RDW 16.0*      MPV 9.5       Recent Labs     10/14/21  1606 10/15/21  1408 10/16/21  0950   PROT 6.1* 6.1* 6.9       No results for input(s): MG in the last 72 hours.     Scheduled Meds:   influenza virus vaccine  0.5 mL IntraMUSCular Prior to discharge    vitamin D3  1 each Oral Daily    docusate  100 mg Oral Daily    multivitamin with iron-minerals  15 mL Oral Daily    sertraline  75 mg Oral Daily    Sodium Hypochlorite   Irrigation Daily    cefTRIAXone (ROCEPHIN) IV  1,000 mg IntraVENous Q24H     Continuous Infusions:   sodium chloride 50 mL/hr at 10/14/21 0104 PRN Meds:.acetaminophen, hydrOXYzine    Assessment: 1. Sepsis , hemodynamically stable. 2.?  UTI due to hickey catheter  3. Chronic indwelling Hickey catheter  4. Alzheimer's dementia  5. Nephrolithiasis. 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 7.Pressure ulcers lt foot and excoriations of buttocks    Plan:   Continue iv Rocephin  Iv fluids  Wound care orders in place, santyl for foot wound and mepilex for buttocks  Blood cultures negative so far  Urine cultures with mixed bacterial growth.   ID consult    Rita Aguilar MD  12:55 PM  10/16/2021

## 2021-10-17 NOTE — PROGRESS NOTES
Subjective:  Awake  No distress. No issues overnight. Objective:    /81   Pulse 102   Temp 99.7 °F (37.6 °C) (Temporal)   Resp 24   Ht 6' (1.829 m)   Wt 160 lb (72.6 kg)   SpO2 98%   BMI 21.70 kg/m²     No intake or output data in the 24 hours ending 10/17/21 0902  Generally: Awake, no distress   Neck: supple. Heart:  RRR, no murmurs, gallops, or rubs. Lungs: Decreased breath sounds b/l. Abd: bowel sounds present,  nondistended, no masses no localized tenderness  Extrem:  No clubbing, cyanosis, or edema  Neuro: Awake, confused, non verbal.    Last 3 BMP  Recent Labs     10/14/21  1606 10/15/21  1408 10/16/21  0950    144 142   K 3.5 4.3 3.6    109* 107   CO2 25 23 24   BUN 11 9 12   CREATININE 0.8 0.8 0.8   GLUCOSE 121* 105* 138*   CALCIUM 8.6 9.1 9.1       Last 3 CMP:    Recent Labs     10/14/21  1606 10/15/21  1408 10/16/21  0950    144 142   K 3.5 4.3 3.6    109* 107   CO2 25 23 24   BUN 11 9 12   CREATININE 0.8 0.8 0.8   GLUCOSE 121* 105* 138*   CALCIUM 8.6 9.1 9.1   PROT 6.1* 6.1* 6.9   LABALBU 2.7* 2.8* 2.9*   BILITOT 0.3 0.4 0.6   ALKPHOS 72 67 78   AST 18 31 19   ALT 11 11 11       CBC:  Recent Labs     10/16/21  0950   WBC 10.3   RBC 4.91   HGB 13.7   HCT 44.7   MCV 91.0   MCH 27.9   MCHC 30.6*   RDW 16.0*      MPV 9.5       Recent Labs     10/14/21  1606 10/15/21  1408 10/16/21  0950   PROT 6.1* 6.1* 6.9       No results for input(s): MG in the last 72 hours.     Scheduled Meds:   enoxaparin  40 mg SubCUTAneous Daily    influenza virus vaccine  0.5 mL IntraMUSCular Prior to discharge    vitamin D3  1 each Oral Daily    docusate  100 mg Oral Daily    multivitamin with iron-minerals  15 mL Oral Daily    sertraline  75 mg Oral Daily    Sodium Hypochlorite   Irrigation Daily    cefTRIAXone (ROCEPHIN) IV  1,000 mg IntraVENous Q24H     Continuous Infusions:   sodium chloride 50 mL/hr at 10/14/21 0104     PRN Meds:.acetaminophen, hydrOXYzine    Assessment: 1. Sepsis , hemodynamically stable. 2.?  UTI due to hickey catheter  3. Chronic indwelling Hickey catheter  4. Alzheimer's dementia  5. Nephrolithiasis. 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 7.Pressure ulcers lt foot and excoriations of buttocks    Plan:   Continue iv Rocephin  Iv fluids  Wound care orders in place, santyl for foot wound and mepilex for buttocks  Blood cultures negative so far  Urine cultures with mixed bacterial growth.   Awaiting ID input today    Lakeshia Haddad MD  9:02 AM  10/17/2021

## 2021-10-17 NOTE — PLAN OF CARE
Problem: Falls - Risk of:  Goal: Will remain free from falls  Description: Will remain free from falls  10/17/2021 0400 by Long Perez RN  Outcome: Met This Shift  10/16/2021 1900 by Cleo Samson RN  Outcome: Met This Shift  Goal: Absence of physical injury  Description: Absence of physical injury  10/17/2021 0400 by Long Perez RN  Outcome: Met This Shift  10/16/2021 1900 by Cleo Samson RN  Outcome: Met This Shift     Problem: Skin Integrity:  Goal: Will show no infection signs and symptoms  Description: Will show no infection signs and symptoms  10/17/2021 0400 by Long Perez RN  Outcome: Met This Shift  10/16/2021 1900 by Cleo Samson RN  Outcome: Met This Shift  Goal: Absence of new skin breakdown  Description: Absence of new skin breakdown  10/17/2021 0400 by Long Perez RN  Outcome: Met This Shift  10/16/2021 1900 by Cleo Samson RN  Outcome: Met This Shift     Problem: Pain:  Goal: Pain level will decrease  Description: Pain level will decrease  10/17/2021 0400 by Long Perez RN  Outcome: Met This Shift  10/16/2021 1900 by Cleo Samson RN  Outcome: Met This Shift  Goal: Control of acute pain  Description: Control of acute pain  10/17/2021 0400 by Long Perez RN  Outcome: Met This Shift  10/16/2021 1900 by Cleo Samson RN  Outcome: Met This Shift  Goal: Control of chronic pain  Description: Control of chronic pain  10/17/2021 0400 by Long Perez RN  Outcome: Met This Shift  10/16/2021 1900 by Cleo Samson RN  Outcome: Met This Shift

## 2021-10-17 NOTE — PROGRESS NOTES
[facility]  [facility]  Progress Note    NAME: Yasmine Alfonso  MR:  [id number]  :   1950  DATE OF SERVICE:10/17/21    This is a face to face encounter with Yasmine Alfonso 66 y.o. male on 10/17/21    CHIEF COMPLAINT     ID following for   Chief Complaint   Patient presents with    Fever     diaphoretic, shaking onset this am-hx obstructing kidney stones     HISTORY OF PRESENT ILLNESS   Pt seen and examined  10/17/21  In bed not arousable no ROS    Patient is tolerating medications. No reported adverse drug reactions. REVIEW OF SYSTEMS     As stated above in the chief complaint, otherwise negative. CURRENT MEDICATIONS      enoxaparin  40 mg SubCUTAneous Daily    influenza virus vaccine  0.5 mL IntraMUSCular Prior to discharge    vitamin D3  1 each Oral Daily    docusate  100 mg Oral Daily    multivitamin with iron-minerals  15 mL Oral Daily    sertraline  75 mg Oral Daily    Sodium Hypochlorite   Irrigation Daily    cefTRIAXone (ROCEPHIN) IV  1,000 mg IntraVENous Q24H     Continuous Infusions:   sodium chloride 50 mL/hr at 10/14/21 0104     PRN Meds:acetaminophen, hydrOXYzine    PHYSICAL EXAM     /81   Pulse 102   Temp 99.7 °F (37.6 °C) (Temporal)   Resp 24   Ht 6' (1.829 m)   Wt 160 lb (72.6 kg)   SpO2 98%   BMI 21.70 kg/m²   Temp  Av.3 °F (37.4 °C)  Min: 98.9 °F (37.2 °C)  Max: 99.7 °F (37.6 °C)  Constitutional:  The patient is in bed on his side   Skin:    Warm and dry   HEENT:    .  AT/NC  Chest:   No use of accessory muscles to breathe. Dec bs ant  Cardiovascular:  S1 and S2 are rhythmic and regular    Abdomen:   Positive bowel sounds to auscultation. Benign to palpation. Dec bs  Extremities:   No clubbing, no cyanosis, no edema.   CNS    Asleep not arousable   Lines: piv  hickey      DIAGNOSTIC RESULTS   Radiology:    Recent Labs     10/15/21  1408 10/16/21  0950 10/17/21  1205   WBC 8.4 10.3 10.1   RBC 4.83 4.91 4.27   HGB 13.8 13.7 12.0* HCT 49.6 44.7 39.3   .7* 91.0 92.0   MCH 28.6 27.9 28.1   MCHC 27.8* 30.6* 30.5*   RDW 16.1* 16.0* 16.3*    323 254   MPV 9.6 9.5 9.4     Recent Labs     10/15/21  1408 10/16/21  0950 10/17/21  0952    142 145   K 4.3 3.6 3.9   * 107 112*   CO2 23 24 24   BUN 9 12 16   CREATININE 0.8 0.8 0.8   GLUCOSE 105* 138* 137*   PROT 6.1* 6.9 7.0   LABALBU 2.8* 2.9* 2.8*   CALCIUM 9.1 9.1 9.4   BILITOT 0.4 0.6 0.5   ALKPHOS 67 78 73   AST 31 19 22   ALT 11 11 10     No results found for: CRP  No results found for: SEDRATE  Recent Labs     10/15/21  1408 10/16/21  0950 10/17/21  0952   AST 31 19 22   ALT 11 11 10     No results found for: CHOL, TRIG, HDL, LDLCALC, LABVLDL  No results found for: VITD25    Microbiology:   No results for input(s): COVID19 in the last 72 hours. Lab Results   Component Value Date    BC 24 Hours no growth 10/13/2021    BLOODCULT2 24 Hours no growth 10/13/2021    BLOODCULT2 5 Days- no growth 08/18/2019    ORG Proteus mirabilis 08/18/2019    ORG Providencia stuartii 08/18/2019           FINAL IMPRESSION    Pt had   Chief Complaint   Patient presents with    Fever     diaphoretic, shaking onset this am-hx obstructing kidney stones    [facility] [A41.9]  Urinary tract infection associated with indwelling urethral catheter, initial encounter ([facility] [T83.511A, N39.0]  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 Thank you for involving me in the care of Gaylin Lesches I will continue to follow.  Please do not hesitate to call for any

## 2021-10-18 NOTE — CARE COORDINATION
SS NOTE: [place] NEGATIVE 10/13. Pt is from [facility] and can return there when stable they are holding his bed. Pt's Legal Guardian Dixie Benavides ([phone] ) is requesting some intervention with pt's Code Status since he has Alzheimers. Pt is a medicaid bedhold at the NH and they will accept back with NO PRECERT or COVID TESTING. He will also need a signed FADI and current PT/OT notes. [facility] will need to know whether he will have ATB.

## 2021-10-18 NOTE — PROGRESS NOTES
[facility]  [facility]  Progress Note    NAME: Rashawn Stokes  MR:  [id number]  :   1950  DATE OF SERVICE:10/18/21    This is a face to face encounter with Rashawn Stokes 66 y.o. male on 10/18/21    CHIEF COMPLAINT     ID following for   Chief Complaint   Patient presents with    Fever     diaphoretic, shaking onset this am-hx obstructing kidney stones     HISTORY OF PRESENT ILLNESS   Pt seen and examined  10/18/21  In bed not arousable no ROS    CONTRACTED  Patient is tolerating medications. No reported adverse drug reactions. REVIEW OF SYSTEMS     As stated above in the chief complaint, otherwise negative. CURRENT MEDICATIONS      enoxaparin  40 mg SubCUTAneous Daily    influenza virus vaccine  0.5 mL IntraMUSCular Prior to discharge    vitamin D3  1 each Oral Daily    docusate  100 mg Oral Daily    multivitamin with iron-minerals  15 mL Oral Daily    sertraline  75 mg Oral Daily    Sodium Hypochlorite   Irrigation Daily    cefTRIAXone (ROCEPHIN) IV  1,000 mg IntraVENous Q24H     Continuous Infusions:   sodium chloride 50 mL/hr at 10/18/21 1026     PRN Meds:acetaminophen, hydrOXYzine    PHYSICAL EXAM     BP (!) 140/104   Pulse 110   Temp 98.7 °F (37.1 °C) (Axillary)   Resp 18   Ht 6' (1.829 m)   Wt 160 lb (72.6 kg)   SpO2 100%   BMI 21.70 kg/m²   Temp  Av °F (37.2 °C)  Min: 98.7 °F (37.1 °C)  Max: 99.5 °F (37.5 °C)  Constitutional:  The patient is in bed SUOINE PALE  Skin:    Warm and dry   HEENT:    .  AT/NC  Chest:   No use of accessory muscles to breathe. Dec bs ant ON ra  Cardiovascular:  S1 and S2 are rhythmic and regular    Abdomen:   SOFT Positive bowel sounds to auscultation. Benign to palpation. Dec bs  Extremities:    no edema.   CNS      not arousable  RESPONDS TO PAIN   Lines: piv  hickey      DIAGNOSTIC RESULTS   Radiology:    Recent Labs     10/16/21  0950 10/17/21  1205 10/18/21  0942   WBC 10.3 10.1 13.3*   RBC 4.91 4.27 5.23   HGB 13.7 12.0* 14.8   HCT 44.7 39.3 48.4   MCV 91.0 92.0 92.5   MCH 27.9 28.1 28.3   MCHC 30.6* 30.5* 30.6*   RDW 16.0* 16.3* 16.5*    254 303   MPV 9.5 9.4 9.7     Recent Labs     10/16/21  0950 10/17/21  0952 10/18/21  0942    145 147*   K 3.6 3.9 3.5    112* 111*   CO2 24 24 26   BUN 12 16 19   CREATININE 0.8 0.8 0.7   GLUCOSE 138* 137* 142*   PROT 6.9 7.0 7.1   LABALBU 2.9* 2.8* 2.8*   CALCIUM 9.1 9.4 9.0   BILITOT 0.6 0.5 0.4   ALKPHOS 78 73 72   AST 19 22 19   ALT 11 10 11     No results found for: CRP  No results found for: SEDRATE  Recent Labs     10/16/21  0950 10/17/21  0952 10/18/21  0942   AST 19 22 19   ALT 11 10 11     No results found for: CHOL, TRIG, HDL, LDLCALC, LABVLDL  No results found for: VITD25    Microbiology:   No results for input(s): COVID19 in the last 72 hours. Lab Results   Component Value Date    BC 24 Hours no growth 10/13/2021    BLOODCULT2 24 Hours no growth 10/13/2021    BLOODCULT2 5 Days- no growth 08/18/2019    ORG Proteus mirabilis 08/18/2019    ORG Providencia stuartii 08/18/2019           FINAL IMPRESSION    Pt had   Chief Complaint   Patient presents with    Fever     diaphoretic, shaking onset this am-hx obstructing kidney stones    [facility] [A41.9]  Urinary tract infection associated with indwelling urethral catheter, initial encounter ([facility] [T83.511A, N39.0]  Pressure injury of contiguous region involving buttock and hip, stage 3, unspecified laterality ([facility] [L89.43]  Sepsis, due to unspecified organism, unspecified whether acute organ dysfunction present ([facility] [A41.9]  On treatment for UTI h/o ESBL  unstageable foot wounds  dermatitis sacral area  urine cx mixed patsy  Blood cx ngtd   cefTRIAXone (ROCEPHIN) 1,000 mg in sodium chloride (PF) 10 mL IV syringe, Q24H  SWITCH TO PO OMNICEF  ? peg    Cont for now    Local skin care    · Monitor labs     . Thank you for involving me in the care of Abimbola Myers I will continue to follow.  Please do not hesitate to call for any questions or concerns.     Electronically signed by Dulce Quintanilla MD on 10/18/2021 at 1:57 PM

## 2021-10-18 NOTE — PLAN OF CARE
Problem: Falls - Risk of:  Goal: Will remain free from falls  Description: Will remain free from falls  10/18/2021 1428 by Randal Elliott RN  Outcome: Ongoing  10/18/2021 0032 by Antonella Real RN  Outcome: Met This Shift  Goal: Absence of physical injury  Description: Absence of physical injury  10/18/2021 1428 by Randal Elliott RN  Outcome: Ongoing  10/18/2021 0032 by Antonella Real RN  Outcome: Met This Shift     Problem: Skin Integrity:  Goal: Will show no infection signs and symptoms  Description: Will show no infection signs and symptoms  10/18/2021 1428 by Randal Elliott RN  Outcome: Ongoing  10/18/2021 0032 by Antonella Real RN  Outcome: Met This Shift  Goal: Absence of new skin breakdown  Description: Absence of new skin breakdown  10/18/2021 1428 by Ranadl Elliott RN  Outcome: Ongoing  10/18/2021 0032 by Antonella Real RN  Outcome: Met This Shift     Problem: Pain:  Goal: Pain level will decrease  Description: Pain level will decrease  10/18/2021 1428 by Randal Elliott RN  Outcome: Ongoing  10/18/2021 0032 by Antonella Real RN  Outcome: Met This Shift  Goal: Control of acute pain  Description: Control of acute pain  10/18/2021 1428 by Randal Elliott RN  Outcome: Ongoing  10/18/2021 0032 by Antonella Real RN  Outcome: Met This Shift  Goal: Control of chronic pain  Description: Control of chronic pain  10/18/2021 1428 by Randal Elliott RN  Outcome: Ongoing  10/18/2021 0032 by Antonella Real RN  Outcome: Met This Shift

## 2021-10-19 NOTE — PLAN OF CARE
Problem: Falls - Risk of:  Goal: Will remain free from falls  Description: Will remain free from falls  10/19/2021 1856 by Italia Garner RN  Outcome: Met This Shift  10/19/2021 1114 by Italia Garner RN  Outcome: Ongoing  Goal: Absence of physical injury  Description: Absence of physical injury  10/19/2021 1856 by Italia Garner RN  Outcome: Met This Shift  10/19/2021 1114 by Italia Garner RN  Outcome: Ongoing     Problem: Skin Integrity:  Goal: Will show no infection signs and symptoms  Description: Will show no infection signs and symptoms  10/19/2021 1856 by Italia Garner RN  Outcome: Met This Shift  10/19/2021 1114 by Italia Garner RN  Outcome: Ongoing  Goal: Absence of new skin breakdown  Description: Absence of new skin breakdown  10/19/2021 1856 by Italia Garner RN  Outcome: Met This Shift  10/19/2021 1114 by Italia Garner RN  Outcome: Ongoing     Problem: Pain:  Goal: Pain level will decrease  Description: Pain level will decrease  10/19/2021 1856 by Italia Garner RN  Outcome: Met This Shift  10/19/2021 1114 by Italia Garner RN  Outcome: Ongoing  Goal: Control of acute pain  Description: Control of acute pain  10/19/2021 1856 by Italia Garner RN  Outcome: Met This Shift  10/19/2021 1114 by Italia Garner RN  Outcome: Ongoing  Goal: Control of chronic pain  Description: Control of chronic pain  10/19/2021 1856 by Italia Garner RN  Outcome: Met This Shift  10/19/2021 1114 by Italia Garner RN  Outcome: Ongoing

## 2021-10-19 NOTE — PLAN OF CARE
Will Continue to monitor patient     Problem: Falls - Risk of:  Goal: Will remain free from falls  Description: Will remain free from falls  10/19/2021 1114 by Delaney Ramirez RN  Outcome: Ongoing  10/19/2021 0011 by Kay Acosta RN  Outcome: Met This Shift  Goal: Absence of physical injury  Description: Absence of physical injury  10/19/2021 1114 by Delaney Ramirez RN  Outcome: Ongoing  10/19/2021 0011 by Kay Acosta RN  Outcome: Met This Shift     Problem: Skin Integrity:  Goal: Will show no infection signs and symptoms  Description: Will show no infection signs and symptoms  10/19/2021 1114 by Delaney Ramirez RN  Outcome: Ongoing  10/19/2021 0011 by Kay Acosta RN  Outcome: Met This Shift  Goal: Absence of new skin breakdown  Description: Absence of new skin breakdown  10/19/2021 1114 by Delaney Ramirez RN  Outcome: Ongoing  10/19/2021 0011 by Kay Acosta RN  Outcome: Met This Shift     Problem: Pain:  Goal: Pain level will decrease  Description: Pain level will decrease  10/19/2021 1114 by Delaney Ramirez RN  Outcome: Ongoing  10/19/2021 0011 by Kay Acosta RN  Outcome: Met This Shift  Goal: Control of acute pain  Description: Control of acute pain  10/19/2021 1114 by Delaney Ramirez RN  Outcome: Ongoing  10/19/2021 0011 by Kay Acosta RN  Outcome: Met This Shift  Goal: Control of chronic pain  Description: Control of chronic pain  10/19/2021 1114 by Delaney Ramirez RN  Outcome: Ongoing  10/19/2021 0011 by Kay Acosta RN  Outcome: Met This Shift

## 2021-10-19 NOTE — PROGRESS NOTES
[facility]  [facility]  Progress Note    NAME: Debra Wooten  MR:  [id number]  :   1950  DATE OF SERVICE:10/19/21    This is a face to face encounter with Debra Wooten 66 y.o. male on 10/19/21    CHIEF COMPLAINT     ID following for   Chief Complaint   Patient presents with    Fever     diaphoretic, shaking onset this am-hx obstructing kidney stones     HISTORY OF PRESENT ILLNESS   Pt seen and examined  10/19/21  In bed awake ate all of breakfast   Does not respond  Patient is tolerating medications. No reported adverse drug reactions. REVIEW OF SYSTEMS     As stated above in the chief complaint, otherwise negative. CURRENT MEDICATIONS      cefdinir  300 mg Oral 2 times per day    enoxaparin  40 mg SubCUTAneous Daily    influenza virus vaccine  0.5 mL IntraMUSCular Prior to discharge    vitamin D3  1 each Oral Daily    docusate  100 mg Oral Daily    multivitamin with iron-minerals  15 mL Oral Daily    sertraline  75 mg Oral Daily    Sodium Hypochlorite   Irrigation Daily     Continuous Infusions:    PRN Meds:acetaminophen, hydrOXYzine    PHYSICAL EXAM     BP (!) 148/90   Pulse 99   Temp 98.1 °F (36.7 °C) (Axillary)   Resp 18   Ht 6' (1.829 m)   Wt 160 lb (72.6 kg)   SpO2 99%   BMI 21.70 kg/m²   Temp  Av.4 °F (36.9 °C)  Min: 98.1 °F (36.7 °C)  Max: 98.7 °F (37.1 °C)  Constitutional:  The patient is in bed PALE nad  Skin:    Warm and dry   HEENT:    AT/NC anicetric  Chest:   No use of accessory muscles to breathe. Dec bs ant ON ra  Cardiovascular:  S1 and S2 are rhythmic and regular    Abdomen:   SOFT Positive bowel sounds to auscultation. Benign to palpation. Dec bs  Extremities:    no edema.   CNS    Awake  RESPONDS TO PAIN   Lines: piv  hickey      DIAGNOSTIC RESULTS   Radiology:    Recent Labs     10/17/21  1205 10/18/21  0942   WBC 10.1 13.3*   RBC 4.27 5.23   HGB 12.0* 14.8   HCT 39.3 48.4   MCV 92.0 92.5   MCH 28.1 28.3   MCHC 30.5* 30.6*   RDW 16.3* 16.5*    303   MPV 9.4 9.7     Recent Labs     10/17/21  0952 10/18/21  0942    147*   K 3.9 3.5   * 111*   CO2 24 26   BUN 16 19   CREATININE 0.8 0.7   GLUCOSE 137* 142*   PROT 7.0 7.1   LABALBU 2.8* 2.8*   CALCIUM 9.4 9.0   BILITOT 0.5 0.4   ALKPHOS 73 72   AST 22 19   ALT 10 11        Recent Labs     10/17/21  0952 10/18/21  0942   AST 22 19   ALT 10 11        Microbiology:   No results for input(s): COVID19 in the last 72 hours. Lab Results   Component Value Date    BC 5 Days no growth 10/13/2021    BLOODCULT2 5 Days no growth 10/13/2021    BLOODCULT2 5 Days- no growth 08/18/2019    ORG Proteus mirabilis 08/18/2019    ORG Providencia stuartii 08/18/2019           FINAL IMPRESSION    Pt had   Chief Complaint   Patient presents with    Fever     diaphoretic, shaking onset this am-hx obstructing kidney stones    Admitted for Sepsis ([facility] [A41.9]  Urinary tract infection associated with indwelling urethral catheter, initial encounter ([facility] [T83.511A, N39.0]  Pressure injury of contiguous region involving buttock and hip, stage 3, unspecified laterality ([facility] [L89.43]  Sepsis, due to unspecified organism, unspecified whether acute organ dysfunction present ([facility] [A41.9]  On treatment for UTI h/o ESBL  unstageable foot wounds  dermatitis sacral area  urine cx mixed patsy  Blood cx ngtd   cefTRIAXone (ROCEPHIN) 1,000 mg in sodium chloride (PF) 10 mL IV syringe, Q24H  SWITCH TO PO OMNICEF is taking po      Cont for now    Local skin care  Can d/c    · Monitor labs     . Thank you for involving me in the care of Yasmine Setting I will continue to follow. Please do not hesitate to call for any questions or concerns.     Electronically signed by Tata Davis MD on 10/19/2021 at 10:41 AM

## 2021-10-19 NOTE — PROGRESS NOTES
Subjective:  Awake  No distress. No issues overnight. Objective:    BP (!) 143/94   Pulse 104   Temp 98.1 °F (36.7 °C) (Axillary)   Resp 18   Ht 6' (1.829 m)   Wt 160 lb (72.6 kg)   SpO2 98%   BMI 21.70 kg/m²       Intake/Output Summary (Last 24 hours) at 10/18/2021 2017  Last data filed at 10/18/2021 1546  Gross per 24 hour   Intake 450 ml   Output 1450 ml   Net -1000 ml     Generally: Awake, no distress   Neck: supple. Heart:  RRR, no murmurs, gallops, or rubs. Lungs: Decreased breath sounds b/l. Abd: bowel sounds present,  nondistended, no masses no localized tenderness  Extrem:  No clubbing, cyanosis, or edema  Neuro: Awake, confused, non verbal.    Last 3 BMP  Recent Labs     10/16/21  0950 10/17/21  0952 10/18/21  0942    145 147*   K 3.6 3.9 3.5    112* 111*   CO2 24 24 26   BUN 12 16 19   CREATININE 0.8 0.8 0.7   GLUCOSE 138* 137* 142*   CALCIUM 9.1 9.4 9.0       Last 3 CMP:    Recent Labs     10/16/21  0950 10/17/21  0952 10/18/21  0942    145 147*   K 3.6 3.9 3.5    112* 111*   CO2 24 24 26   BUN 12 16 19   CREATININE 0.8 0.8 0.7   GLUCOSE 138* 137* 142*   CALCIUM 9.1 9.4 9.0   PROT 6.9 7.0 7.1   LABALBU 2.9* 2.8* 2.8*   BILITOT 0.6 0.5 0.4   ALKPHOS 78 73 72   AST 19 22 19   ALT 11 10 11       CBC:  Recent Labs     10/18/21  0942   WBC 13.3*   RBC 5.23   HGB 14.8   HCT 48.4   MCV 92.5   MCH 28.3   MCHC 30.6*   RDW 16.5*      MPV 9.7       Recent Labs     10/16/21  0950 10/17/21  0952 10/18/21  0942   PROT 6.9 7.0 7.1       No results for input(s): MG in the last 72 hours.     Scheduled Meds:   cefdinir  300 mg Oral 2 times per day    enoxaparin  40 mg SubCUTAneous Daily    influenza virus vaccine  0.5 mL IntraMUSCular Prior to discharge    vitamin D3  1 each Oral Daily    docusate  100 mg Oral Daily    multivitamin with iron-minerals  15 mL Oral Daily    sertraline  75 mg Oral Daily    Sodium Hypochlorite   Irrigation Daily     Continuous Infusions:   sodium chloride 50 mL/hr at 10/18/21 1026     PRN Meds:.acetaminophen, hydrOXYzine    Assessment: 1. Sepsis , hemodynamically stable. 2.?  UTI due to hickey catheter  3. Chronic indwelling Hickey catheter  4. Alzheimer's dementia  5. Nephrolithiasis. 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 7.Pressure ulcers lt foot and excoriations of buttocks    Plan:   Continue iv Rocephin  DC iv fluids  Wound care orders in place, santyl for foot wound and mepilex for buttocks  Blood cultures negative so far  Urine cultures with mixed bacterial growth. ID following and recommending changing to po omnicef on discharge  ?  Discharge in am.    Jono Broderick MD  8:17 PM  10/18/2021

## 2021-10-19 NOTE — CARE COORDINATION
SS NOTE: [place] NEGATIVE 10/13. Urine cultures positive. ID and Woundcare are consulted for stage 3 pressure ulcer buttocks and hip, he has unstageable foot wounds. Pt is on IV Rocephin which will be changed to po omnicef at [facility]. Pt is from [facility] and can return there when stable they are holding his bed. Pt's Legal Guardian is Mary Villavicencio ([phone] ext [phone]). . Pt is a medicaid bedhold at the NH and they will accept back with NO PRECERT or COVID TESTING. He will also need a signed FADI and current PT/OT notes. SS to continue. CLARA Goldman.10/19/2021.11:54 AM,.

## 2021-10-19 NOTE — PROGRESS NOTES
Spoke with TODD @ [facility]. Ok to d/c to facility tonight pending negative rapid CoVID-19 results. [facility] p/u @ 2300 tonight via ambulance stretcher. [facility] updated and aware. Attempted to update Guardian Isabel Baltazar with no answer after minutes of ringing and no available voicemail either.  Electronically signed by Geremias Mulligan RN on 10/19/2021 at 7:08 PM

## 2021-10-20 NOTE — DISCHARGE SUMMARY
[address]                               DISCHARGE SUMMARY    PATIENT NAME: Endy Plasencia                    :        1950  MED REC NO:   [id number]                            ROOM:       7907  ACCOUNT NO:   [de-identified]                           ADMIT DATE: 10/13/2021  PROVIDER:     Christi Varma MD                  DISCHARGE DATE:  10/19/2021    DISCHARGE DIAGNOSES:  1. Sepsis. 2.  UTI due to Wright catheter most likely. 3.  Chronic indwelling Wright catheter. 4.  Alzheimer's dementia. 5.  Nephrolithiasis. 6.  BPH with obstruction and chronic Wright catheter. 7.  Pressure ulcer of the left foot and excoriation of the buttocks. HOSPITAL COURSE:  The patient is an unfortunate 66years-old gentleman  with advanced Alzheimer's dementia. He presented from the nursing home  with fevers. The patient was found to be septic. He was placed on IV  fluids and placed on IV Rocephin for possible UTI. His urinalysis  revealed evidence of several organisms which is polymicrobial.  He also  had pressure ulcer of the left foot and excoriation of the buttocks. He  was evaluated by wound care. ID consultation was obtained and he was  maintained on IV Rocephin. Wound care was in place. His blood cultures  came back negative. The patient remained fairly hemodynamically stable  throughout this admission. Infectious Disease eventually changed his IV  Rocephin to Omnicef 300 b.i.d. to finish 7-day course at the nursing  home. The patient was then discharged to [facility]  in clinically stable condition. I will discuss with the   and staff at the nursing home for possible change of his code status  because of his advanced Alzheimer's dementia. The patient was  discharged in clinically stable condition.         Candie Soares MD    D: 10/19/2021 19:43:09       T: 10/20/2021 7:04:00     [email or web address]  Job#: [id number]     Doc#: [id number]    CC:

## 2021-10-20 NOTE — PLAN OF CARE
Problem: Falls - Risk of:  Goal: Will remain free from falls  Description: Will remain free from falls  10/19/2021 2235 by Frantz Acuna RN  Outcome: Completed  10/19/2021 1856 by Nabeel Grissom RN  Outcome: Met This Shift  10/19/2021 1114 by Nabeel Grissom RN  Outcome: Ongoing     Problem: Falls - Risk of:  Goal: Absence of physical injury  Description: Absence of physical injury  10/19/2021 2235 by Frantz Acuna RN  Outcome: Completed  10/19/2021 1856 by Nabeel Grissom RN  Outcome: Met This Shift  10/19/2021 1114 by Nabeel Grissom RN  Outcome: Ongoing     Problem: Skin Integrity:  Goal: Will show no infection signs and symptoms  Description: Will show no infection signs and symptoms  10/19/2021 2235 by Frantz Acuna RN  Outcome: Completed  10/19/2021 1856 by Nabeel Grissom RN  Outcome: Met This Shift  10/19/2021 1114 by Nabeel Grissom RN  Outcome: Ongoing     Problem: Skin Integrity:  Goal: Absence of new skin breakdown  Description: Absence of new skin breakdown  10/19/2021 2235 by Frantz Acuna RN  Outcome: Completed  10/19/2021 1856 by Nabeel Grissom RN  Outcome: Met This Shift  10/19/2021 1114 by Nabeel Grissom RN  Outcome: Ongoing     Problem: Pain:  Goal: Pain level will decrease  Description: Pain level will decrease  10/19/2021 2235 by Frantz Acuna RN  Outcome: Completed  10/19/2021 1856 by Nabeel Grissom RN  Outcome: Met This Shift  10/19/2021 1114 by Nabeel Grissom RN  Outcome: Ongoing     Problem: Pain:  Goal: Control of acute pain  Description: Control of acute pain  10/19/2021 2235 by Frantz Acuna RN  Outcome: Completed  10/19/2021 1856 by Nabeel Grissom RN  Outcome: Met This Shift  10/19/2021 1114 by Nabeel Grissom RN  Outcome: Ongoing     Problem: Pain:  Goal: Control of chronic pain  Description: Control of chronic pain  10/19/2021 2235 by Frantz Acuna RN  Outcome: Completed  10/19/2021 1856 by Nabeel Grissom RN  Outcome: Met This Shift  10/19/2021 1114 by Nabeel Grissom RN  Outcome: Ongoing

## (undated) DEVICE — YANKAUER,OPEN TIP,W/O VENT,STERILE: Brand: MEDLINE INDUSTRIES, INC.

## (undated) DEVICE — BASIC SINGLE BASIN 1-LF: Brand: MEDLINE INDUSTRIES, INC.

## (undated) DEVICE — JELLY PETROLEUM 5GR FOIL PK

## (undated) DEVICE — MARKER,SKIN,WI/RULER AND LABELS: Brand: MEDLINE

## (undated) DEVICE — SYRINGE,EAR/ULCER, 3 OZ, STERILE: Brand: MEDLINE

## (undated) DEVICE — GLOVE SURG SZ 65 THK91MIL LTX FREE SYN POLYISOPRENE

## (undated) DEVICE — GAUZE,SPONGE,4"X4",16PLY,XRAY,STRL,LF: Brand: MEDLINE

## (undated) DEVICE — TUBING SUCT 12FR MAL ALUM SHFT FN CAP VENT UNIV CONN W/ OBT

## (undated) DEVICE — COUNTER NDL 30 COUNT DBL MAG

## (undated) DEVICE — SURGICAL PROCEDURE PACK EENT CUST

## (undated) DEVICE — PACKING,VAGINAL,XR,ST,4"X36",100EA: Brand: MEDLINE

## (undated) DEVICE — GOWN,SIRUS,FABRNF,XL,20/CS: Brand: MEDLINE

## (undated) DEVICE — TOWEL,OR,DSP,ST,BLUE,STD,6/PK,12PK/CS: Brand: MEDLINE

## (undated) DEVICE — SET PROPHY

## (undated) DEVICE — SPONGE LAP W3/8XL1.5IN COT CYL CNTR STRUNG N RADPQ STRL

## (undated) DEVICE — COVER,LIGHT HANDLE,FLX,2/PK: Brand: MEDLINE INDUSTRIES, INC.

## (undated) DEVICE — GOWN,SIRUS,FABRNF,L,20/CS: Brand: MEDLINE